# Patient Record
Sex: FEMALE | Race: OTHER | NOT HISPANIC OR LATINO | Employment: FULL TIME | URBAN - METROPOLITAN AREA
[De-identification: names, ages, dates, MRNs, and addresses within clinical notes are randomized per-mention and may not be internally consistent; named-entity substitution may affect disease eponyms.]

---

## 2017-06-02 ENCOUNTER — OFFICE VISIT (OUTPATIENT)
Dept: FAMILY MEDICINE | Facility: CLINIC | Age: 62
End: 2017-06-02
Payer: COMMERCIAL

## 2017-06-02 VITALS
HEART RATE: 98 BPM | SYSTOLIC BLOOD PRESSURE: 102 MMHG | RESPIRATION RATE: 18 BRPM | HEIGHT: 63 IN | TEMPERATURE: 100 F | DIASTOLIC BLOOD PRESSURE: 68 MMHG | WEIGHT: 131.38 LBS | BODY MASS INDEX: 23.28 KG/M2 | OXYGEN SATURATION: 96 %

## 2017-06-02 DIAGNOSIS — R52 BODY ACHES: Primary | ICD-10-CM

## 2017-06-02 DIAGNOSIS — N39.0 URINARY TRACT INFECTION WITH HEMATURIA, SITE UNSPECIFIED: ICD-10-CM

## 2017-06-02 DIAGNOSIS — R31.9 URINARY TRACT INFECTION WITH HEMATURIA, SITE UNSPECIFIED: ICD-10-CM

## 2017-06-02 DIAGNOSIS — J06.9 UPPER RESPIRATORY TRACT INFECTION, UNSPECIFIED TYPE: ICD-10-CM

## 2017-06-02 PROCEDURE — 99214 OFFICE O/P EST MOD 30 MIN: CPT | Mod: S$GLB,,, | Performed by: FAMILY MEDICINE

## 2017-06-02 PROCEDURE — 99999 PR PBB SHADOW E&M-EST. PATIENT-LVL III: CPT | Mod: PBBFAC,,, | Performed by: FAMILY MEDICINE

## 2017-06-02 RX ORDER — AMOXICILLIN AND CLAVULANATE POTASSIUM 500; 125 MG/1; MG/1
1 TABLET, FILM COATED ORAL 2 TIMES DAILY
Qty: 14 TABLET | Refills: 0 | Status: SHIPPED | OUTPATIENT
Start: 2017-06-02 | End: 2017-08-21

## 2017-06-02 RX ORDER — LORATADINE 10 MG/1
10 TABLET ORAL DAILY
Qty: 30 TABLET | Refills: 0 | Status: SHIPPED | OUTPATIENT
Start: 2017-06-02 | End: 2017-11-29

## 2017-06-02 NOTE — PROGRESS NOTES
Chief Complaint   Patient presents with    Sinusitis     2d    Cough     No production    Diarrhea     started this morning    Sore Throat       HPI  Bianka Bernard is a 61 y.o. female with multiple medical diagnoses as listed in the medical history and problem list that presents for URI.    URI - HA, +fever, +sneezing, +coughing, +body aches, +diarrhea, sore throat, 2 day hx.       Pt is known to me and was last seen by me on 1/21/2015.    PAST MEDICAL HISTORY:  Past Medical History:   Diagnosis Date    Diabetes mellitus, type 2     Hematuria     Long hx of this.  Being followed by Nephrologist, Dr. Burgos at [facility].       PAST SURGICAL HISTORY:  History reviewed. No pertinent surgical history.    SOCIAL HISTORY:  Social History     Social History    Marital status:      Spouse name: N/A    Number of children: N/A    Years of education: N/A     Occupational History    Not on file.     Social History Main Topics    Smoking status: Never Smoker    Smokeless tobacco: Not on file    Alcohol use No    Drug use: No    Sexual activity: Yes     Partners: Male     Other Topics Concern    Not on file     Social History Narrative    No narrative on file       FAMILY HISTORY:  Family History   Problem Relation Age of Onset    Hypertension Mother     Hypertension Father     Deep vein thrombosis Father     Hypertension Sister        ALLERGIES AND MEDICATIONS: updated and reviewed.  Review of patient's allergies indicates:   Allergen Reactions    Sulfa (sulfonamide antibiotics) Hives     Current Outpatient Prescriptions   Medication Sig Dispense Refill    meloxicam (MOBIC) 15 MG tablet Take 1 tablet (15 mg total) by mouth once daily. 30 tablet 3    metformin (GLUCOPHAGE) 1000 MG tablet   3    amoxicillin-clavulanate 500-125mg (AUGMENTIN) 500-125 mg Tab Take 1 tablet (500 mg total) by mouth 2 (two) times daily. 14 tablet 0    azithromycin (ZITHROMAX Z-LEANNA) 250 MG tablet 2 tabs today, then 1 tab per  "day for 4 days. 6 tablet 0    gabapentin (NEURONTIN) 300 MG capsule Take 1 capsule (300 mg total) by mouth every evening. In 1 week, if no relief, may increase dose to twice per day.  In another week, if no relief, may increase dose to 3 times per day. 90 capsule 2    loratadine (CLARITIN) 10 mg tablet Take 1 tablet (10 mg total) by mouth once daily. 30 tablet 0    metformin (GLUCOPHAGE) 850 MG tablet   3    VITAMIN D2 50,000 unit capsule   2     No current facility-administered medications for this visit.        ROS  Review of Systems   Constitutional: Negative for activity change, appetite change and fever.   HENT: Positive for congestion, postnasal drip, rhinorrhea, sinus pressure and sneezing.    Eyes: Positive for itching.   Respiratory: Positive for cough. Negative for shortness of breath and wheezing.    Cardiovascular: Negative for chest pain.   Gastrointestinal: Negative for abdominal pain, diarrhea and nausea.   Endocrine: Negative for polydipsia.   Genitourinary: Negative for dysuria.   Musculoskeletal: Negative for neck stiffness.   Skin: Negative for rash.   Neurological: Negative for numbness.   Psychiatric/Behavioral: Negative for agitation and dysphoric mood.       Physical Exam  Vitals:    06/02/17 0927   BP: 102/68   Pulse: 98   Resp: 18   Temp: 100.2 °F (37.9 °C)    Body mass index is 23.28 kg/m².  Weight: 59.6 kg (131 lb 6.3 oz)   Height: 5' 3" (160 cm)     Physical Exam   Constitutional: She is oriented to person, place, and time. She appears well-developed and well-nourished.   HENT:   Head: Normocephalic.   Mouth/Throat: No oropharyngeal exudate.   Erythematous pharynx  Erythematous, edematous nares  Mild dull TMs bilaterally   Eyes: Pupils are equal, round, and reactive to light. No scleral icterus.   Neck: Normal range of motion. Neck supple.   Cardiovascular: Normal rate, regular rhythm and normal heart sounds.    Pulmonary/Chest: Effort normal and breath sounds normal. No respiratory " distress.   Abdominal: Soft. Bowel sounds are normal. There is no tenderness.   Lymphadenopathy:     She has cervical adenopathy.   Neurological: She is alert and oriented to person, place, and time.   Skin: Skin is warm. No rash noted.   Psychiatric: She has a normal mood and affect. Her behavior is normal. Judgment and thought content normal.       Health Maintenance       Date Due Completion Date    Hepatitis C Screening 1955 ---    Foot Exam 09/23/1965 ---    Eye Exam 09/23/1965 ---    TETANUS VACCINE 09/23/1973 ---    Pap Smear with HPV Cotest 09/23/1976 ---    Mammogram 09/23/1995 ---    Colonoscopy 09/23/2005 ---    Hemoglobin A1c 03/05/2015 9/5/2014    Lipid Panel 09/05/2015 9/5/2014    Zoster Vaccine 09/23/2015 ---    Influenza Vaccine 08/01/2017 ---          Assessment & Plan    Body aches  -     POCT urinalysis, dipstick or tablet reag    Upper respiratory tract infection, unspecified type  -     loratadine (CLARITIN) 10 mg tablet; Take 1 tablet (10 mg total) by mouth once daily.  Dispense: 30 tablet; Refill: 0  - Will conservatively treat    Urinary tract infection with hematuria, site unspecified  -     amoxicillin-clavulanate 500-125mg (AUGMENTIN) 500-125 mg Tab; Take 1 tablet (500 mg total) by mouth 2 (two) times daily.  Dispense: 14 tablet; Refill: 0  -     Urine culture  - Will treat at this time, culture as well.         Return in about 4 weeks (around 6/30/2017).

## 2017-08-21 ENCOUNTER — OFFICE VISIT (OUTPATIENT)
Dept: FAMILY MEDICINE | Facility: CLINIC | Age: 62
End: 2017-08-21
Payer: COMMERCIAL

## 2017-08-21 VITALS
HEART RATE: 80 BPM | WEIGHT: 129.19 LBS | BODY MASS INDEX: 22.89 KG/M2 | DIASTOLIC BLOOD PRESSURE: 84 MMHG | HEIGHT: 63 IN | TEMPERATURE: 99 F | SYSTOLIC BLOOD PRESSURE: 132 MMHG | RESPIRATION RATE: 12 BRPM | OXYGEN SATURATION: 97 %

## 2017-08-21 DIAGNOSIS — Z12.31 OTHER SCREENING MAMMOGRAM: ICD-10-CM

## 2017-08-21 DIAGNOSIS — Z13.5 SCREENING FOR EYE CONDITION: ICD-10-CM

## 2017-08-21 DIAGNOSIS — R11.2 NAUSEA AND VOMITING, INTRACTABILITY OF VOMITING NOT SPECIFIED, UNSPECIFIED VOMITING TYPE: Primary | ICD-10-CM

## 2017-08-21 LAB
BILIRUB SERPL-MCNC: NORMAL MG/DL
BLOOD URINE, POC: 250
COLOR, POC UA: YELLOW
GLUCOSE SERPL-MCNC: 182 MG/DL (ref 70–110)
GLUCOSE UR QL STRIP: NORMAL
KETONES UR QL STRIP: NORMAL
LEUKOCYTE ESTERASE URINE, POC: NORMAL
NITRITE, POC UA: NORMAL
PH, POC UA: 5
PROTEIN, POC: NORMAL
SPECIFIC GRAVITY, POC UA: 1.02
UROBILINOGEN, POC UA: 1

## 2017-08-21 PROCEDURE — 99999 PR PBB SHADOW E&M-EST. PATIENT-LVL V: CPT | Mod: PBBFAC,,, | Performed by: PHYSICIAN ASSISTANT

## 2017-08-21 PROCEDURE — 3008F BODY MASS INDEX DOCD: CPT | Mod: S$GLB,,, | Performed by: PHYSICIAN ASSISTANT

## 2017-08-21 PROCEDURE — 99214 OFFICE O/P EST MOD 30 MIN: CPT | Mod: S$GLB,,, | Performed by: PHYSICIAN ASSISTANT

## 2017-08-21 PROCEDURE — 82948 REAGENT STRIP/BLOOD GLUCOSE: CPT | Mod: S$GLB,,, | Performed by: PHYSICIAN ASSISTANT

## 2017-08-21 PROCEDURE — 81002 URINALYSIS NONAUTO W/O SCOPE: CPT | Mod: S$GLB,,, | Performed by: PHYSICIAN ASSISTANT

## 2017-08-21 RX ORDER — ONDANSETRON 4 MG/1
4 TABLET, FILM COATED ORAL EVERY 8 HOURS PRN
Qty: 30 TABLET | Refills: 0 | Status: ON HOLD | OUTPATIENT
Start: 2017-08-21 | End: 2019-01-29 | Stop reason: HOSPADM

## 2017-08-21 NOTE — PROGRESS NOTES
Subjective:       Patient ID: Bianka Bernard is a 61 y.o. female.    Chief Complaint: Nausea (has felt nauseaous since friday, weakness)    Emesis    This is a new problem. The current episode started in the past 7 days (4 days). The problem occurs less than 2 times per day. The problem has been unchanged. The emesis has an appearance of bile. There has been no fever. Associated symptoms include dizziness. Pertinent negatives include no abdominal pain, chest pain, diarrhea, fever or myalgias. She has tried nothing for the symptoms.     Review of Systems   Constitutional: Negative for fever.   Cardiovascular: Negative for chest pain.   Gastrointestinal: Positive for nausea and vomiting. Negative for abdominal pain and diarrhea.   Genitourinary: Negative for dysuria, frequency and pelvic pain.   Musculoskeletal: Negative for myalgias.   Neurological: Positive for dizziness.       Objective:      Physical Exam   Constitutional: She appears well-developed and well-nourished. No distress.   HENT:   Head: Normocephalic and atraumatic.   Mouth/Throat: Oropharynx is clear and moist.   Cardiovascular: Normal rate and regular rhythm.    Pulmonary/Chest: Effort normal and breath sounds normal.   Abdominal: Soft. Bowel sounds are normal. She exhibits no distension. There is no tenderness.   Skin: She is not diaphoretic.   Vitals reviewed.      Assessment:       1. Nausea and vomiting, intractability of vomiting not specified, unspecified vomiting type    2. Screening for eye condition    3. Other screening mammogram        Plan:         Bianka was seen today for nausea.    Diagnoses and all orders for this visit:    Nausea and vomiting, intractability of vomiting not specified, unspecified vomiting type  -     POCT URINE DIPSTICK WITHOUT MICROSCOPE  -     POCT Glucose  -     ondansetron (ZOFRAN) 4 MG tablet; Take 1 tablet (4 mg total) by mouth every 8 (eight) hours as needed for Nausea.  -     Push fluids, increase diet as tolerated      Screening for eye condition  -     Ambulatory referral to Optometry    Other screening mammogram  -     Mammo Digital Screening Bilateral With CAD; Future

## 2017-11-29 ENCOUNTER — OFFICE VISIT (OUTPATIENT)
Dept: FAMILY MEDICINE | Facility: CLINIC | Age: 62
End: 2017-11-29
Payer: COMMERCIAL

## 2017-11-29 ENCOUNTER — HOSPITAL ENCOUNTER (OUTPATIENT)
Dept: RADIOLOGY | Facility: HOSPITAL | Age: 62
Discharge: HOME | End: 2017-11-29
Attending: PHYSICIAN ASSISTANT
Payer: COMMERCIAL

## 2017-11-29 ENCOUNTER — TELEPHONE (OUTPATIENT)
Dept: FAMILY MEDICINE | Facility: CLINIC | Age: 62
End: 2017-11-29

## 2017-11-29 VITALS
RESPIRATION RATE: 18 BRPM | BODY MASS INDEX: 23.48 KG/M2 | HEIGHT: 63 IN | HEART RATE: 93 BPM | TEMPERATURE: 98 F | OXYGEN SATURATION: 97 % | WEIGHT: 132.5 LBS | SYSTOLIC BLOOD PRESSURE: 132 MMHG | DIASTOLIC BLOOD PRESSURE: 70 MMHG

## 2017-11-29 DIAGNOSIS — M25.512 ACUTE PAIN OF LEFT SHOULDER: ICD-10-CM
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
## 2017-11-29 DIAGNOSIS — E11.9 TYPE 2 DIABETES MELLITUS WITHOUT COMPLICATION, WITHOUT LONG-TERM CURRENT USE OF INSULIN: ICD-10-CM

## 2017-11-29 DIAGNOSIS — Z11.59 NEED FOR HEPATITIS C SCREENING TEST: ICD-10-CM

## 2017-11-29 DIAGNOSIS — Z13.5 SCREENING FOR EYE CONDITION: ICD-10-CM

## 2017-11-29 DIAGNOSIS — Z13.220 SCREENING FOR CHOLESTEROL LEVEL: ICD-10-CM
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
## 2017-11-29 PROCEDURE — 73030 X-RAY EXAM OF SHOULDER: CPT | Mod: 26,LT,, | Performed by: RADIOLOGY

## 2017-11-29 PROCEDURE — 73030 X-RAY EXAM OF SHOULDER: CPT | Mod: TC,PO,LT

## 2017-11-29 PROCEDURE — 99214 OFFICE O/P EST MOD 30 MIN: CPT | Mod: S$GLB,,, | Performed by: PHYSICIAN ASSISTANT

## 2017-11-29 PROCEDURE — 99999 PR PBB SHADOW E&M-EST. PATIENT-LVL IV: CPT | Mod: PBBFAC,,, | Performed by: PHYSICIAN ASSISTANT

## 2017-11-29 RX ORDER — TIZANIDINE 4 MG/1
4 TABLET ORAL EVERY 8 HOURS
Qty: 30 TABLET | Refills: 0 | Status: SHIPPED | OUTPATIENT
Start: 2017-11-29 | End: 2017-12-09

## 2017-11-29 RX ORDER — NAPROXEN 500 MG/1
500 TABLET ORAL 2 TIMES DAILY
Qty: 30 TABLET | Refills: 0 | Status: SHIPPED | OUTPATIENT
Start: 2017-11-29 | End: 2017-12-28 | Stop reason: SDUPTHER
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
## 2017-11-29 NOTE — TELEPHONE ENCOUNTER
Pt seen for shoulder pain, after getting xray pt asked for note to return to work on Tuesday; states she will  Thursday morning when she comes for labs; please advise

## 2017-11-29 NOTE — PROGRESS NOTES
Diabetes Panel - will get today   Eye Exam - need referral to eye doctor   Foot Exam - will get today   Hepatitis C Screening- will get today   Influenza Vaccine - pt got vaccinated with job   Lipid Panel - will get today

## 2017-11-30 ENCOUNTER — LAB VISIT (OUTPATIENT)
Dept: LAB | Facility: HOSPITAL | Age: 62
End: 2017-11-30
Attending: PHYSICIAN ASSISTANT
Payer: COMMERCIAL

## 2017-11-30 DIAGNOSIS — Z11.59 NEED FOR HEPATITIS C SCREENING TEST: ICD-10-CM

## 2017-11-30 DIAGNOSIS — E11.9 TYPE 2 DIABETES MELLITUS WITHOUT COMPLICATION, WITHOUT LONG-TERM CURRENT USE OF INSULIN: ICD-10-CM

## 2017-11-30 DIAGNOSIS — Z13.220 SCREENING FOR CHOLESTEROL LEVEL: ICD-10-CM

## 2017-11-30 LAB
ALBUMIN SERPL BCP-MCNC: 3.6 G/DL
ALP SERPL-CCNC: 75 U/L
ALT SERPL W/O P-5'-P-CCNC: 23 U/L
ANION GAP SERPL CALC-SCNC: 8 MMOL/L
AST SERPL-CCNC: 20 U/L
BILIRUB SERPL-MCNC: 0.6 MG/DL
BUN SERPL-MCNC: 12 MG/DL
CALCIUM SERPL-MCNC: 9.6 MG/DL
CHLORIDE SERPL-SCNC: 105 MMOL/L
CHOLEST SERPL-MCNC: 216 MG/DL
CHOLEST/HDLC SERPL: 4.5 {RATIO}
CO2 SERPL-SCNC: 28 MMOL/L
CREAT SERPL-MCNC: 0.8 MG/DL
EST. GFR  (AFRICAN AMERICAN): >60 ML/MIN/1.73 M^2
EST. GFR  (NON AFRICAN AMERICAN): >60 ML/MIN/1.73 M^2
ESTIMATED AVG GLUCOSE: 160 MG/DL
GLUCOSE SERPL-MCNC: 164 MG/DL
HBA1C MFR BLD HPLC: 7.2 %
HDLC SERPL-MCNC: 48 MG/DL
HDLC SERPL: 22.2 %
LDLC SERPL CALC-MCNC: 126.2 MG/DL
NONHDLC SERPL-MCNC: 168 MG/DL
POTASSIUM SERPL-SCNC: 4.4 MMOL/L
PROT SERPL-MCNC: 7 G/DL
SODIUM SERPL-SCNC: 141 MMOL/L
TRIGL SERPL-MCNC: 209 MG/DL

## 2017-11-30 PROCEDURE — 86803 HEPATITIS C AB TEST: CPT

## 2017-11-30 PROCEDURE — 80061 LIPID PANEL: CPT

## 2017-11-30 PROCEDURE — 36415 COLL VENOUS BLD VENIPUNCTURE: CPT | Mod: PO

## 2017-11-30 PROCEDURE — 83036 HEMOGLOBIN GLYCOSYLATED A1C: CPT

## 2017-11-30 PROCEDURE — 80053 COMPREHEN METABOLIC PANEL: CPT

## 2017-12-01 DIAGNOSIS — E11.9 TYPE 2 DIABETES MELLITUS WITHOUT COMPLICATION: ICD-10-CM

## 2017-12-01 LAB — HCV AB SERPL QL IA: NEGATIVE

## 2017-12-27 ENCOUNTER — INITIAL CONSULT (OUTPATIENT)
Dept: OPTOMETRY | Facility: CLINIC | Age: 62
End: 2017-12-27
Payer: COMMERCIAL

## 2017-12-27 DIAGNOSIS — E11.9 TYPE 2 DIABETES MELLITUS WITHOUT OPHTHALMIC MANIFESTATIONS: Primary | ICD-10-CM

## 2017-12-27 DIAGNOSIS — H52.4 MYOPIA WITH PRESBYOPIA OF BOTH EYES: ICD-10-CM

## 2017-12-27 DIAGNOSIS — H52.13 MYOPIA WITH PRESBYOPIA OF BOTH EYES: ICD-10-CM

## 2017-12-27 DIAGNOSIS — H25.13 NUCLEAR SCLEROSIS OF BOTH EYES: ICD-10-CM

## 2017-12-27 PROCEDURE — 92004 COMPRE OPH EXAM NEW PT 1/>: CPT | Mod: S$GLB,,, | Performed by: OPTOMETRIST

## 2017-12-27 PROCEDURE — 99999 PR PBB SHADOW E&M-EST. PATIENT-LVL II: CPT | Mod: PBBFAC,,, | Performed by: OPTOMETRIST

## 2017-12-27 PROCEDURE — 92015 DETERMINE REFRACTIVE STATE: CPT | Mod: S$GLB,,, | Performed by: OPTOMETRIST

## 2017-12-27 NOTE — LETTER
December 27, 2017      Francisca Maya PA-C  [address]  [place] [address]           [facility]  [address]  [place] [address]  Phone: [phone]  Fax: [phone]          Patient: Bianka Bernard   MR Number: [id number]   YOB: 1955   Date of Visit: 12/27/2017       Dear Francisca Maya:    Thank you for referring Bianka Bernard to me for evaluation. Attached you will find relevant portions of my assessment and plan of care.    If you have questions, please do not hesitate to call me. I look forward to following Bianka Bernard along with you.    Sincerely,    Vera Benavidez, OD    Enclosure  CC:  No Recipients    If you would like to receive this communication electronically, please contact [email or web address] or [phone] to request more information on [employer] Link access.    For providers and/or their staff who would like to refer a patient to [facility], please contact us through our one-stop-shop provider referral line, [facility], at [phone].    If you feel you have received this communication in error or would no longer like to receive these types of communications, please e-mail [email or web address]

## 2017-12-27 NOTE — PROGRESS NOTES
HPI     Last eye exam was approximately 1 year ago.  Patient states no vision changes since last exam. Due for diabetic eye   exam.  Patient denies diplopia, headaches, flashes/floaters, and pain.    Hemoglobin A1C       Date                     Value               Ref Range             Status                11/30/2017               7.2 (H)             4.0 - 5.6 %           Final                  Last edited by Cindi Horner on 12/27/2017  2:26 PM. (History)            Assessment /Plan     For exam results, see Encounter Report.    Type 2 diabetes mellitus without ophthalmic manifestations    Nuclear sclerosis of both eyes    Myopia with presbyopia of both eyes            1.  No retinopathy--monitor yearly.   Educated pt eye health correlates with blood sugar control.    2.  Educated on cataracts and affects on vision.  Early-monitor.  3.  Bifocal rx given          RTC 1 year for dm exam.

## 2017-12-28 DIAGNOSIS — M25.512 ACUTE PAIN OF LEFT SHOULDER: ICD-10-CM

## 2017-12-28 RX ORDER — NAPROXEN 500 MG/1
500 TABLET ORAL 2 TIMES DAILY
Qty: 30 TABLET | Refills: 0 | Status: ON HOLD | OUTPATIENT
Start: 2017-12-28 | End: 2019-01-29 | Stop reason: HOSPADM

## 2017-12-28 NOTE — TELEPHONE ENCOUNTER
----- Message from Kenzie Cervantes sent at 12/28/2017 11:56 AM CST -----  Contact: self  Pt states she is still in pain and asking for a prescription to be sent to [facility].  Medication request is---naproxen (NAPROSYN) 500 MG tablet--- Pt call back [phone].

## 2018-01-15 ENCOUNTER — HOSPITAL ENCOUNTER (OUTPATIENT)
Dept: RADIOLOGY | Facility: HOSPITAL | Age: 63
Discharge: HOME | End: 2018-01-15
Attending: FAMILY MEDICINE
Payer: COMMERCIAL

## 2018-01-15 ENCOUNTER — OFFICE VISIT (OUTPATIENT)
Dept: INTERNAL MEDICINE | Facility: CLINIC | Age: 63
End: 2018-01-15
Payer: COMMERCIAL

## 2018-01-15 VITALS
WEIGHT: 132.5 LBS | SYSTOLIC BLOOD PRESSURE: 110 MMHG | HEIGHT: 63 IN | DIASTOLIC BLOOD PRESSURE: 60 MMHG | BODY MASS INDEX: 23.48 KG/M2

## 2018-01-15 DIAGNOSIS — R31.9 HEMATURIA, UNSPECIFIED TYPE: ICD-10-CM

## 2018-01-15 DIAGNOSIS — Z00.00 PREVENTATIVE HEALTH CARE: ICD-10-CM

## 2018-01-15 DIAGNOSIS — Z12.31 ENCOUNTER FOR SCREENING MAMMOGRAM FOR BREAST CANCER: ICD-10-CM

## 2018-01-15 DIAGNOSIS — E11.9 TYPE 2 DIABETES MELLITUS WITHOUT COMPLICATION, WITHOUT LONG-TERM CURRENT USE OF INSULIN: Primary | ICD-10-CM

## 2018-01-15 PROCEDURE — 77067 SCR MAMMO BI INCL CAD: CPT | Mod: 26,,, | Performed by: RADIOLOGY

## 2018-01-15 PROCEDURE — 82043 UR ALBUMIN QUANTITATIVE: CPT

## 2018-01-15 PROCEDURE — 99999 PR PBB SHADOW E&M-EST. PATIENT-LVL III: CPT | Mod: PBBFAC,,, | Performed by: FAMILY MEDICINE

## 2018-01-15 PROCEDURE — 77067 SCR MAMMO BI INCL CAD: CPT | Mod: TC

## 2018-01-15 PROCEDURE — 99396 PREV VISIT EST AGE 40-64: CPT | Mod: S$GLB,,, | Performed by: FAMILY MEDICINE

## 2018-01-15 RX ORDER — METFORMIN HYDROCHLORIDE 1000 MG/1
1000 TABLET ORAL 2 TIMES DAILY WITH MEALS
Qty: 90 TABLET | Refills: 3 | Status: SHIPPED | OUTPATIENT
Start: 2018-01-15 | End: 2018-10-01 | Stop reason: SDUPTHER

## 2018-01-15 RX ORDER — ATORVASTATIN CALCIUM 20 MG/1
20 TABLET, FILM COATED ORAL DAILY
Qty: 90 TABLET | Refills: 3 | Status: SHIPPED | OUTPATIENT
Start: 2018-01-15 | End: 2019-01-28

## 2018-01-15 NOTE — PROGRESS NOTES
Subjective:       Patient ID: Bianka Bernard is a 62 y.o. female.    Chief Complaint: Establish Care  Bianak Bernard 62 y.o. female is here for office visit to review care and physical exam,  HPI  Review of Systems   Constitutional: Negative for activity change, fatigue, fever and unexpected weight change.   HENT: Negative for congestion, hearing loss, postnasal drip and rhinorrhea.    Eyes: Negative for redness and visual disturbance.   Respiratory: Negative for chest tightness, shortness of breath and wheezing.    Cardiovascular: Negative for chest pain, palpitations and leg swelling.   Gastrointestinal: Negative for abdominal distention.   Genitourinary: Negative for decreased urine volume, dysuria, flank pain, hematuria, pelvic pain and urgency.   Musculoskeletal: Negative for back pain, gait problem, joint swelling and neck stiffness.   Skin: Negative for color change, rash and wound.   Neurological: Negative for dizziness, syncope, weakness and headaches.   Psychiatric/Behavioral: Negative for behavioral problems, confusion and sleep disturbance. The patient is not nervous/anxious.        Objective:      Physical Exam   Constitutional: She is oriented to person, place, and time. She appears well-developed and well-nourished. No distress.   HENT:   Head: Normocephalic.   Mouth/Throat: No oropharyngeal exudate.   Eyes: EOM are normal. Pupils are equal, round, and reactive to light. No scleral icterus.   Neck: Neck supple. No JVD present. No thyromegaly present.   Cardiovascular: Normal rate, regular rhythm and normal heart sounds.  Exam reveals no gallop and no friction rub.    No murmur heard.  Pulmonary/Chest: Effort normal and breath sounds normal. She has no wheezes. She has no rales.   Abdominal: Soft. Bowel sounds are normal. She exhibits no distension and no mass. There is no tenderness. There is no guarding.   Musculoskeletal: Normal range of motion. She exhibits no edema.   Lymphadenopathy:     She has no  cervical adenopathy.   Neurological: She is alert and oriented to person, place, and time. She has normal reflexes. She displays normal reflexes. No cranial nerve deficit. She exhibits normal muscle tone.   Skin: Skin is warm. No rash noted. No erythema.   Psychiatric: She has a normal mood and affect. Thought content normal.       Assessment:       1. Type 2 diabetes mellitus without complication, without long-term current use of insulin    2. Hematuria, unspecified type    3. Preventative health care        Plan:       Bianka was seen today for establish care.    Diagnoses and all orders for this visit:    Type 2 diabetes mellitus without complication, without long-term current use of insulin  -     atorvastatin (LIPITOR) 20 MG tablet; Take 1 tablet (20 mg total) by mouth once daily.  -     MICROALBUMIN / CREATININE RATIO URINE    Hematuria, unspecified type  -     Urinalysis; Future    Preventative health care  -     TSH; Future  -     Mammo Digital Screening Bilateral With CAD; Future

## 2018-01-16 LAB
CREAT UR-MCNC: 38 MG/DL
MICROALBUMIN UR DL<=1MG/L-MCNC: 4.8 UG/ML
MICROALBUMIN/CREATININE RATIO: 12.6 UG/MG

## 2018-04-16 PROBLEM — Z00.00 PREVENTATIVE HEALTH CARE: Status: RESOLVED | Noted: 2018-01-15 | Resolved: 2018-04-16

## 2018-07-11 ENCOUNTER — OFFICE VISIT (OUTPATIENT)
Dept: INTERNAL MEDICINE | Facility: CLINIC | Age: 63
End: 2018-07-11
Payer: COMMERCIAL

## 2018-07-11 ENCOUNTER — LAB VISIT (OUTPATIENT)
Dept: LAB | Facility: HOSPITAL | Age: 63
End: 2018-07-11
Attending: FAMILY MEDICINE
Payer: COMMERCIAL

## 2018-07-11 VITALS
WEIGHT: 136.25 LBS | HEIGHT: 63 IN | HEART RATE: 80 BPM | BODY MASS INDEX: 24.14 KG/M2 | SYSTOLIC BLOOD PRESSURE: 122 MMHG | DIASTOLIC BLOOD PRESSURE: 80 MMHG | OXYGEN SATURATION: 98 %

## 2018-07-11 DIAGNOSIS — E11.9 TYPE 2 DIABETES MELLITUS WITHOUT COMPLICATION, WITHOUT LONG-TERM CURRENT USE OF INSULIN: Primary | ICD-10-CM

## 2018-07-11 DIAGNOSIS — E78.00 PURE HYPERCHOLESTEROLEMIA: ICD-10-CM

## 2018-07-11 DIAGNOSIS — Z00.00 PREVENTATIVE HEALTH CARE: ICD-10-CM

## 2018-07-11 DIAGNOSIS — E11.9 TYPE 2 DIABETES MELLITUS WITHOUT COMPLICATION, WITHOUT LONG-TERM CURRENT USE OF INSULIN: ICD-10-CM

## 2018-07-11 DIAGNOSIS — R31.9 HEMATURIA, UNSPECIFIED TYPE: ICD-10-CM

## 2018-07-11 PROBLEM — E78.5 HYPERLIPIDEMIA: Status: ACTIVE | Noted: 2018-07-11

## 2018-07-11 LAB
ALBUMIN SERPL BCP-MCNC: 4 G/DL
ALP SERPL-CCNC: 77 U/L
ALT SERPL W/O P-5'-P-CCNC: 30 U/L
ANION GAP SERPL CALC-SCNC: 10 MMOL/L
AST SERPL-CCNC: 25 U/L
BILIRUB SERPL-MCNC: 0.5 MG/DL
BUN SERPL-MCNC: 10 MG/DL
CALCIUM SERPL-MCNC: 10 MG/DL
CHLORIDE SERPL-SCNC: 103 MMOL/L
CHOLEST SERPL-MCNC: 125 MG/DL
CHOLEST/HDLC SERPL: 2.2 {RATIO}
CO2 SERPL-SCNC: 26 MMOL/L
CREAT SERPL-MCNC: 0.9 MG/DL
EST. GFR  (AFRICAN AMERICAN): >60 ML/MIN/1.73 M^2
EST. GFR  (NON AFRICAN AMERICAN): >60 ML/MIN/1.73 M^2
ESTIMATED AVG GLUCOSE: 192 MG/DL
GLUCOSE SERPL-MCNC: 295 MG/DL
HBA1C MFR BLD HPLC: 8.3 %
HDLC SERPL-MCNC: 57 MG/DL
HDLC SERPL: 45.6 %
LDLC SERPL CALC-MCNC: 42 MG/DL
NONHDLC SERPL-MCNC: 68 MG/DL
POTASSIUM SERPL-SCNC: 5.2 MMOL/L
PROT SERPL-MCNC: 7.1 G/DL
SODIUM SERPL-SCNC: 139 MMOL/L
TRIGL SERPL-MCNC: 130 MG/DL
TSH SERPL DL<=0.005 MIU/L-ACNC: 0.89 UIU/ML

## 2018-07-11 PROCEDURE — 80061 LIPID PANEL: CPT

## 2018-07-11 PROCEDURE — 80053 COMPREHEN METABOLIC PANEL: CPT

## 2018-07-11 PROCEDURE — 36415 COLL VENOUS BLD VENIPUNCTURE: CPT

## 2018-07-11 PROCEDURE — 83036 HEMOGLOBIN GLYCOSYLATED A1C: CPT

## 2018-07-11 PROCEDURE — 99999 PR PBB SHADOW E&M-EST. PATIENT-LVL III: CPT | Mod: PBBFAC,,, | Performed by: FAMILY MEDICINE

## 2018-07-11 PROCEDURE — 3045F PR MOST RECENT HEMOGLOBIN A1C LEVEL 7.0-9.0%: CPT | Mod: CPTII,S$GLB,, | Performed by: FAMILY MEDICINE

## 2018-07-11 PROCEDURE — 99214 OFFICE O/P EST MOD 30 MIN: CPT | Mod: S$GLB,,, | Performed by: FAMILY MEDICINE

## 2018-07-11 PROCEDURE — 84443 ASSAY THYROID STIM HORMONE: CPT

## 2018-07-11 NOTE — PROGRESS NOTES
Subjective:       Patient ID: Bianka Bernard is a 62 y.o. female.    Chief Complaint: Follow-up  Bianka Bernard 62 y.o. female is here for office visit to review care and physical exam, here for usual care.  Feels well, is active, has changed diet and having less carbs/rice.  Here for usual care.  Was being seen at [facility].  Notes long h/u hematuria, not sure about previous testing.  HPI  Review of Systems   Constitutional: Negative for activity change, fatigue, fever and unexpected weight change.   HENT: Negative for congestion, hearing loss, postnasal drip and rhinorrhea.    Eyes: Negative for redness and visual disturbance.   Respiratory: Negative for chest tightness, shortness of breath and wheezing.    Cardiovascular: Negative for chest pain, palpitations and leg swelling.   Gastrointestinal: Negative for abdominal distention.   Genitourinary: Negative for decreased urine volume, dysuria, flank pain, hematuria, pelvic pain and urgency.   Musculoskeletal: Negative for back pain, gait problem, joint swelling and neck stiffness.   Skin: Negative for color change, rash and wound.   Neurological: Negative for dizziness, syncope, weakness and headaches.   Psychiatric/Behavioral: Negative for behavioral problems, confusion and sleep disturbance. The patient is not nervous/anxious.        Objective:      Physical Exam   Constitutional: She is oriented to person, place, and time. She appears well-developed and well-nourished. No distress.   HENT:   Head: Normocephalic.   Mouth/Throat: No oropharyngeal exudate.   Eyes: EOM are normal. Pupils are equal, round, and reactive to light. No scleral icterus.   Neck: Neck supple. No JVD present. No thyromegaly present.   Cardiovascular: Normal rate, regular rhythm and normal heart sounds.  Exam reveals no gallop and no friction rub.    No murmur heard.  Pulmonary/Chest: Effort normal and breath sounds normal. She has no wheezes. She has no rales.   Abdominal: Soft. Bowel sounds are  normal. She exhibits no distension and no mass. There is no tenderness. There is no guarding.   Musculoskeletal: Normal range of motion. She exhibits no edema.   Lymphadenopathy:     She has no cervical adenopathy.   Neurological: She is alert and oriented to person, place, and time. She has normal reflexes. She displays normal reflexes. No cranial nerve deficit. She exhibits normal muscle tone.   Skin: Skin is warm. No rash noted. No erythema.   Psychiatric: She has a normal mood and affect. Thought content normal.       Assessment:       1. Type 2 diabetes mellitus without complication, without long-term current use of insulin    2. Pure hypercholesterolemia    3. Hematuria, unspecified type    4. Preventative health care        Plan:       Bianka was seen today for follow-up.    Diagnoses and all orders for this visit:    Type 2 diabetes mellitus without complication, without long-term current use of insulin  -     Hemoglobin A1c; Future  -     Lipid panel; Future  -     Comprehensive metabolic panel; Future  -     TSH; Future    Pure hypercholesterolemia  -     Hemoglobin A1c; Future  -     Lipid panel; Future  -     Comprehensive metabolic panel; Future    Hematuria, unspecified type  -     Ambulatory Referral to Urology    Preventative health care  -     Fecal Immunochemical Test (iFOBT); Future

## 2018-07-11 NOTE — ADDENDUM NOTE
Addended by: NATALIIA BASS on: 7/11/2018 03:51 PM     Modules accepted: Orders, Level of Service

## 2018-07-12 ENCOUNTER — TELEPHONE (OUTPATIENT)
Dept: INTERNAL MEDICINE | Facility: CLINIC | Age: 63
End: 2018-07-12

## 2018-07-20 ENCOUNTER — TELEPHONE (OUTPATIENT)
Dept: INTERNAL MEDICINE | Facility: CLINIC | Age: 63
End: 2018-07-20

## 2018-08-01 ENCOUNTER — CLINICAL SUPPORT (OUTPATIENT)
Dept: AUDIOLOGY | Facility: CLINIC | Age: 63
End: 2018-08-01
Payer: COMMERCIAL

## 2018-08-01 DIAGNOSIS — H90.0 CONDUCTIVE HEARING LOSS, BILATERAL: Primary | ICD-10-CM

## 2018-08-01 PROCEDURE — 92567 TYMPANOMETRY: CPT | Mod: S$GLB,,, | Performed by: AUDIOLOGIST

## 2018-08-01 PROCEDURE — 92557 COMPREHENSIVE HEARING TEST: CPT | Mod: S$GLB,,, | Performed by: AUDIOLOGIST

## 2018-08-01 NOTE — PROGRESS NOTES
8/1/2018    AUDIOLOGICAL EVALUATION:    Bianka Bernard was seen for an audiological evaluation on 8/1/2018 for decreased hearing sensitivity bilaterally.    Pure tone threshold testing revealed a conductive hearing loss bilaterally, poorer for the right ear.  Speech reception thresholds were obtained at 45dBHL for the right ear and 30dBHL for the left ear.  Speech discrimination scores were obtained at 100% for the right ear and 100% for the left ear.    Tympanometry revealed Type A tympanograms bilaterally with absent acoustic reflexes.    Recommend:  1.  Otologic evaluation.  2.  Follow up audio as needed.

## 2018-08-07 ENCOUNTER — OFFICE VISIT (OUTPATIENT)
Dept: UROLOGY | Facility: CLINIC | Age: 63
End: 2018-08-07
Payer: COMMERCIAL

## 2018-08-07 VITALS
DIASTOLIC BLOOD PRESSURE: 85 MMHG | SYSTOLIC BLOOD PRESSURE: 160 MMHG | BODY MASS INDEX: 23.94 KG/M2 | HEART RATE: 83 BPM | HEIGHT: 63 IN | WEIGHT: 135.13 LBS

## 2018-08-07 DIAGNOSIS — R31.21 ASYMPTOMATIC MICROSCOPIC HEMATURIA: Primary | ICD-10-CM

## 2018-08-07 PROCEDURE — 88112 CYTOPATH CELL ENHANCE TECH: CPT | Mod: 26,,, | Performed by: PATHOLOGY

## 2018-08-07 PROCEDURE — 99243 OFF/OP CNSLTJ NEW/EST LOW 30: CPT | Mod: S$GLB,,, | Performed by: UROLOGY

## 2018-08-07 PROCEDURE — 99999 PR PBB SHADOW E&M-EST. PATIENT-LVL IV: CPT | Mod: PBBFAC,,, | Performed by: UROLOGY

## 2018-08-07 PROCEDURE — 88112 CYTOPATH CELL ENHANCE TECH: CPT | Performed by: PATHOLOGY

## 2018-08-07 NOTE — LETTER
August 7, 2018      Eloy Max MD  [address]  [facility] [address]           [facility] 4th Floor  [address]  [facility] [address]  Phone: [phone]          Patient: Bianka Bernard   MR Number: [id number]   YOB: 1955   Date of Visit: 8/7/2018       Dear Dr. Eloy Max:    Thank you for referring Bianka Bernard to me for evaluation. Attached you will find relevant portions of my assessment and plan of care.    If you have questions, please do not hesitate to call me. I look forward to following Bianka Bernard along with you.    Sincerely,    Marcio Arriaga MD    Enclosure  CC:  No Recipients    If you would like to receive this communication electronically, please contact [email or web address] or [phone] to request more information on [employer] Link access.    For providers and/or their staff who would like to refer a patient to [facility], please contact us through our one-stop-shop provider referral line, [facility], at [phone].    If you feel you have received this communication in error or would no longer like to receive these types of communications, please e-mail [email or web address]

## 2018-08-07 NOTE — PROGRESS NOTES
CC: Hematuria (hx of microhematuria for over 15 years. states she was a patient at [facility] for 15 years and had a total work up and was seeing a nephrologist. she is establing care here now. non smoker )      Bianka Bernard is a 62 y.o. woman who is here for the evaluation of Hematuria (hx of microhematuria for over 15 years. states she was a patient at [facility] for 15 years and had a total work up and was seeing a nephrologist. she is establing care here now. non smoker )    Hasn't seen blood but has previously had trace amount in UA. Notes she has a history of hematuria for the past 20 years. No problems voiding. Previous seen by Nephrologist, Dr. Burgos at [facility].   Non-smoker, no family hx of kidney stone, urologic malignancy.    Past Medical History:   Diagnosis Date    Cataract     Diabetes mellitus, type 2     Hematuria     Long hx of this.  Being followed by Nephrologist, Dr. Burgos at [facility].     Past Surgical History:   Procedure Laterality Date    BREAST BIOPSY Left     core    BREAST CYST ASPIRATION Bilateral      Social History   Substance Use Topics    Smoking status: Never Smoker    Smokeless tobacco: Never Used    Alcohol use No     Family History   Problem Relation Age of Onset    Hypertension Mother     Cataracts Mother     Hypertension Father     Deep vein thrombosis Father     Hypertension Sister      Allergy:  Review of patient's allergies indicates:   Allergen Reactions    Sulfa (sulfonamide antibiotics) Hives     Outpatient Encounter Prescriptions as of 8/7/2018   Medication Sig Dispense Refill    atorvastatin (LIPITOR) 20 MG tablet Take 1 tablet (20 mg total) by mouth once daily. 90 tablet 3    metFORMIN (GLUCOPHAGE) 1000 MG tablet Take 1 tablet (1,000 mg total) by mouth 2 (two) times daily with meals. 90 tablet 3    naproxen (NAPROSYN) 500 MG tablet Take 1 tablet (500 mg total) by mouth 2 (two) times daily. 30 tablet 0    ondansetron (ZOFRAN) 4 MG tablet Take 1 tablet (4 mg  total) by mouth every 8 (eight) hours as needed for Nausea. 30 tablet 0    VITAMIN D2 50,000 unit capsule Take 50,000 Units by mouth every 30 days.   2     No facility-administered encounter medications on file as of 8/7/2018.      Review of Systems   Review of Systems   Constitutional: Negative for weight loss.   HENT: Negative for hearing loss and tinnitus.    Respiratory: Negative for cough and shortness of breath.    Cardiovascular: Negative for chest pain.   Gastrointestinal: Negative for diarrhea, nausea and vomiting.   Genitourinary: Positive for hematuria. Negative for dysuria and frequency.   Musculoskeletal: Negative for joint pain.   Skin: Negative for rash.   Neurological: Negative for tremors, sensory change, seizures and headaches.   Endo/Heme/Allergies: Negative for polydipsia.   Psychiatric/Behavioral: Negative for memory loss.     Physical Exam     Vitals:    08/07/18 1515   BP: (!) 160/85   Pulse: 83     Physical Exam   Constitutional: She appears well-developed.   HENT:   Head: Normocephalic.   Neck: Neck supple.   Cardiovascular: Normal rate.    Pulmonary/Chest: Effort normal.   Abdominal: Soft.   Neurological: She is alert.   Skin: Skin is warm.     Psychiatric: She has a normal mood and affect.         LABS:  Lab Results   Component Value Date    CREATININE 0.9 07/11/2018    CREATININE 0.8 11/30/2017     No results found for: LABURIN     UA shows trace amounts of blood.    Assessment and Plan:  Bianka was seen today for hematuria.    Diagnoses and all orders for this visit:    Asymptomatic microscopic hematuria  -     Cytology, urine  -     POCT urine dipstick without microscope  -     US Retroperitoneal Limited; Future      Discussed CAT scan or US to ensure there are no urologic problems.  Schedule US today.  Blood test shows normal kidney function.  Urine cytology today, will schedule cystoscopy if any abnormalities are found.  Will return if she notices blood in urine for full urologic  workup.  All questions were answered.    Follow-up:  Follow-up if symptoms worsen or fail to improve.       I, Cas Mccollum, am acting as a scribe on this patient encounter in the presence and under the supervision of Dr. Arriaga.    08/07/2018 3:36 PM    I, Dr. Arriaga personally performed the services described in this documentation. All medical record entries made by the scribe were at my direction and in my presence.  I have reviewed the chart and agree that the record reflects my personal performance and is accurate and complete.     Dr. Arriaga  4:43 PM 08/07/2018

## 2018-08-09 ENCOUNTER — PATIENT MESSAGE (OUTPATIENT)
Dept: UROLOGY | Facility: CLINIC | Age: 63
End: 2018-08-09

## 2018-08-27 ENCOUNTER — HOSPITAL ENCOUNTER (OUTPATIENT)
Dept: RADIOLOGY | Facility: HOSPITAL | Age: 63
Discharge: HOME | End: 2018-08-27
Attending: UROLOGY
Payer: COMMERCIAL

## 2018-08-27 DIAGNOSIS — R31.21 ASYMPTOMATIC MICROSCOPIC HEMATURIA: ICD-10-CM

## 2018-08-27 PROCEDURE — 76775 US EXAM ABDO BACK WALL LIM: CPT | Mod: 26,,, | Performed by: RADIOLOGY

## 2018-08-27 PROCEDURE — 76775 US EXAM ABDO BACK WALL LIM: CPT | Mod: TC

## 2018-10-01 RX ORDER — METFORMIN HYDROCHLORIDE 1000 MG/1
1000 TABLET ORAL 2 TIMES DAILY WITH MEALS
Qty: 90 TABLET | Refills: 3 | Status: SHIPPED | OUTPATIENT
Start: 2018-10-01

## 2018-12-17 ENCOUNTER — OFFICE VISIT (OUTPATIENT)
Dept: OTOLARYNGOLOGY | Facility: CLINIC | Age: 63
End: 2018-12-17
Payer: COMMERCIAL

## 2018-12-17 VITALS
BODY MASS INDEX: 23.82 KG/M2 | DIASTOLIC BLOOD PRESSURE: 80 MMHG | WEIGHT: 134.5 LBS | SYSTOLIC BLOOD PRESSURE: 151 MMHG | HEART RATE: 90 BPM

## 2018-12-17 DIAGNOSIS — H80.93 OTOSCLEROSIS OF BOTH EARS: Primary | ICD-10-CM

## 2018-12-17 PROCEDURE — 99999 PR PBB SHADOW E&M-EST. PATIENT-LVL II: CPT | Mod: PBBFAC,,, | Performed by: OTOLARYNGOLOGY

## 2018-12-17 PROCEDURE — 3008F BODY MASS INDEX DOCD: CPT | Mod: CPTII,S$GLB,, | Performed by: OTOLARYNGOLOGY

## 2018-12-17 PROCEDURE — 99203 OFFICE O/P NEW LOW 30 MIN: CPT | Mod: S$GLB,,, | Performed by: OTOLARYNGOLOGY

## 2018-12-17 NOTE — PROGRESS NOTES
Subjective:       Patient ID: Bianka Bernard is a 63 y.o. female.    Chief Complaint: Hearing Loss    HPI: Has Joseph Prog HL.    AD> AS for yrs.    No Hx of inf.    Pos fam hx.    Past Medical History: Patient has a past medical history of Cataract, Diabetes mellitus, type 2, and Hematuria.    Past Surgical History: Patient has a past surgical history that includes Breast cyst aspiration (Bilateral) and Breast biopsy (Left).    Social History: Patient reports that  has never smoked. she has never used smokeless tobacco. She reports that she does not drink alcohol or use drugs.    Family History: family history includes Cataracts in her mother; Deep vein thrombosis in her father; Hypertension in her father, mother, and sister.    Medications:   Current Outpatient Medications   Medication Sig    atorvastatin (LIPITOR) 20 MG tablet Take 1 tablet (20 mg total) by mouth once daily.    metFORMIN (GLUCOPHAGE) 1000 MG tablet Take 1 tablet (1,000 mg total) by mouth 2 (two) times daily with meals.    naproxen (NAPROSYN) 500 MG tablet Take 1 tablet (500 mg total) by mouth 2 (two) times daily.    ondansetron (ZOFRAN) 4 MG tablet Take 1 tablet (4 mg total) by mouth every 8 (eight) hours as needed for Nausea.    VITAMIN D2 50,000 unit capsule Take 50,000 Units by mouth every 30 days.      No current facility-administered medications for this visit.        Allergies: Patient is allergic to sulfa (sulfonamide antibiotics).      Review of Systems   Constitutional: Negative for appetite change, chills, fatigue and fever.   HENT: Positive for hearing loss and tinnitus. Negative for congestion, ear discharge, facial swelling, postnasal drip, rhinorrhea, sore throat and trouble swallowing.    Eyes: Negative for photophobia, pain, discharge, redness, itching and visual disturbance.   Respiratory: Negative for apnea, cough, choking, chest tightness, shortness of breath, wheezing and stridor.    Cardiovascular: Negative for chest pain and  palpitations.   Gastrointestinal: Negative for abdominal pain, constipation, diarrhea, nausea and vomiting.   Musculoskeletal: Negative for arthralgias, gait problem, joint swelling, myalgias, neck pain and neck stiffness.   Skin: Negative for color change, pallor, rash and wound.   Neurological: Negative for dizziness, tremors, seizures, syncope, facial asymmetry, speech difficulty, weakness, light-headedness, numbness and headaches.   Hematological: Negative for adenopathy. Does not bruise/bleed easily.   Psychiatric/Behavioral: Negative for agitation, behavioral problems, confusion, decreased concentration, dysphoric mood, hallucinations, sleep disturbance and suicidal ideas. The patient is not nervous/anxious and is not hyperactive.        Objective:      Physical Exam   Constitutional: She is oriented to person, place, and time. She appears well-developed and well-nourished. She is cooperative.  Non-toxic appearance. She does not have a sickly appearance. She does not appear ill. No distress.   HENT:   Head: Normocephalic and atraumatic. Not macrocephalic and not microcephalic. Head is without raccoon's eyes, without Brothers's sign, without abrasion, without contusion, without laceration, without right periorbital erythema and without left periorbital erythema. Hair is normal.   Right Ear: Ear canal normal. No lacerations. No drainage, swelling or tenderness. No foreign bodies. No mastoid tenderness. Tympanic membrane is not injected, not scarred, not perforated, not erythematous, not retracted and not bulging. Tympanic membrane mobility is normal. No middle ear effusion. No hemotympanum. Decreased hearing is noted.   Left Ear: Ear canal normal. No lacerations. No drainage, swelling or tenderness. No foreign bodies. No mastoid tenderness. Tympanic membrane is not injected, not scarred, not perforated, not erythematous, not retracted and not bulging. Tympanic membrane mobility is normal.  No middle ear effusion.  No hemotympanum. Decreased hearing is noted.   Nose: No mucosal edema, rhinorrhea, nose lacerations, sinus tenderness, nasal deformity, septal deviation or nasal septal hematoma. No epistaxis.  No foreign bodies. Right sinus exhibits no maxillary sinus tenderness. Left sinus exhibits no maxillary sinus tenderness.       Rinne neg reed         Eyes: Conjunctivae, EOM and lids are normal. Pupils are equal, round, and reactive to light.   Neck: Normal range of motion. Neck supple. No JVD present. No tracheal tenderness and no muscular tenderness present. No neck rigidity. No tracheal deviation, no edema, no erythema and normal range of motion present. No thyroid mass and no thyromegaly present.   Cardiovascular: Normal rate and regular rhythm.   Pulmonary/Chest: Effort normal. No accessory muscle usage or stridor. No apnea, no tachypnea and no bradypnea. No respiratory distress.   Abdominal: Soft. Normal appearance.   Musculoskeletal: Normal range of motion.   Lymphadenopathy:        Head (right side): No submental, no submandibular, no tonsillar, no preauricular and no posterior auricular adenopathy present.        Head (left side): No submental, no submandibular, no tonsillar, no preauricular and no posterior auricular adenopathy present.     She has no cervical adenopathy.        Right cervical: No superficial cervical, no deep cervical and no posterior cervical adenopathy present.       Left cervical: No superficial cervical, no deep cervical and no posterior cervical adenopathy present.   Neurological: She is alert and oriented to person, place, and time. She displays no atrophy and no tremor. No cranial nerve deficit or sensory deficit. She exhibits normal muscle tone. She displays no seizure activity.   Skin: Skin is warm, dry and intact. No abrasion, no bruising, no burn, no ecchymosis, no laceration, no lesion and no rash noted. She is not diaphoretic. No erythema. No pallor.   Psychiatric: She has a normal  mood and affect. Her behavior is normal. Judgment and thought content normal. Her speech is not rapid and/or pressured and not slurred. Cognition and memory are normal. She is communicative.   Nursing note and vitals reviewed.              Assessment:       1. Otosclerosis of both ears        Plan:         Discussed Right small fenestra stapedotomy with patient. Also reviewed all other options including observation and amplification. Risks, complications, benefits and goals reviewed. This includes: failure to improve, total loss of hearing, tinnitus, dizziness, chorda symptoms, infection, bleeding, need for revision and other potential complications. Will proceed at the patients convinience a general outpatient procedure after appropriate clearances.

## 2018-12-21 ENCOUNTER — TELEPHONE (OUTPATIENT)
Dept: OTOLARYNGOLOGY | Facility: CLINIC | Age: 63
End: 2018-12-21

## 2018-12-21 NOTE — TELEPHONE ENCOUNTER
----- Message from Clari Natarajan sent at 12/21/2018  9:30 AM CST -----  Contact: patient   [phone]-please call above patient at number in message need to speak with the nurse

## 2018-12-26 ENCOUNTER — TELEPHONE (OUTPATIENT)
Dept: OTOLARYNGOLOGY | Facility: CLINIC | Age: 63
End: 2018-12-26

## 2018-12-26 DIAGNOSIS — H80.93 OTOSCLEROSIS OF BOTH EARS: Primary | ICD-10-CM

## 2019-01-28 RX ORDER — VIT C/E/ZN/COPPR/LUTEIN/ZEAXAN 250MG-90MG
1000 CAPSULE ORAL EVERY MORNING
COMMUNITY

## 2019-01-29 ENCOUNTER — HOSPITAL ENCOUNTER (OUTPATIENT)
Facility: HOSPITAL | Age: 64
Discharge: HOME | End: 2019-01-29
Attending: OTOLARYNGOLOGY | Admitting: OTOLARYNGOLOGY
Payer: COMMERCIAL

## 2019-01-29 ENCOUNTER — ANESTHESIA EVENT (OUTPATIENT)
Dept: SURGERY | Facility: HOSPITAL | Age: 64
End: 2019-01-29
Payer: COMMERCIAL

## 2019-01-29 ENCOUNTER — ANESTHESIA (OUTPATIENT)
Dept: SURGERY | Facility: HOSPITAL | Age: 64
End: 2019-01-29
Payer: COMMERCIAL

## 2019-01-29 VITALS
TEMPERATURE: 98 F | DIASTOLIC BLOOD PRESSURE: 69 MMHG | SYSTOLIC BLOOD PRESSURE: 148 MMHG | BODY MASS INDEX: 23.04 KG/M2 | RESPIRATION RATE: 18 BRPM | HEIGHT: 63 IN | WEIGHT: 130 LBS | OXYGEN SATURATION: 100 % | HEART RATE: 72 BPM

## 2019-01-29 DIAGNOSIS — H80.91 OTOSCLEROSIS OF RIGHT EAR: Primary | ICD-10-CM

## 2019-01-29 LAB
POCT GLUCOSE: 221 MG/DL (ref 70–110)
POCT GLUCOSE: 256 MG/DL (ref 70–110)
POCT GLUCOSE: 327 MG/DL (ref 70–110)

## 2019-01-29 PROCEDURE — 27000221 HC OXYGEN, UP TO 24 HOURS

## 2019-01-29 PROCEDURE — 36000708 HC OR TIME LEV III 1ST 15 MIN: Performed by: OTOLARYNGOLOGY

## 2019-01-29 PROCEDURE — D9220A PRA ANESTHESIA: Mod: ANES,,, | Performed by: ANESTHESIOLOGY

## 2019-01-29 PROCEDURE — D9220A PRA ANESTHESIA: ICD-10-PCS | Mod: ANES,,, | Performed by: ANESTHESIOLOGY

## 2019-01-29 PROCEDURE — 82962 GLUCOSE BLOOD TEST: CPT | Performed by: OTOLARYNGOLOGY

## 2019-01-29 PROCEDURE — 36000709 HC OR TIME LEV III EA ADD 15 MIN: Performed by: OTOLARYNGOLOGY

## 2019-01-29 PROCEDURE — 63600175 PHARM REV CODE 636 W HCPCS: Performed by: OTOLARYNGOLOGY

## 2019-01-29 PROCEDURE — 63600175 PHARM REV CODE 636 W HCPCS: Performed by: NURSE ANESTHETIST, CERTIFIED REGISTERED

## 2019-01-29 PROCEDURE — 37000008 HC ANESTHESIA 1ST 15 MINUTES: Performed by: OTOLARYNGOLOGY

## 2019-01-29 PROCEDURE — 69660 PR STAPEDECTOMY: ICD-10-PCS | Mod: RT,,, | Performed by: OTOLARYNGOLOGY

## 2019-01-29 PROCEDURE — D9220A PRA ANESTHESIA: ICD-10-PCS | Mod: CRNA,,, | Performed by: NURSE ANESTHETIST, CERTIFIED REGISTERED

## 2019-01-29 PROCEDURE — 71000033 HC RECOVERY, INTIAL HOUR: Performed by: OTOLARYNGOLOGY

## 2019-01-29 PROCEDURE — 27201423 OPTIME MED/SURG SUP & DEVICES STERILE SUPPLY: Performed by: OTOLARYNGOLOGY

## 2019-01-29 PROCEDURE — 94761 N-INVAS EAR/PLS OXIMETRY MLT: CPT

## 2019-01-29 PROCEDURE — 37000009 HC ANESTHESIA EA ADD 15 MINS: Performed by: OTOLARYNGOLOGY

## 2019-01-29 PROCEDURE — 25000003 PHARM REV CODE 250

## 2019-01-29 PROCEDURE — 63600175 PHARM REV CODE 636 W HCPCS: Performed by: ANESTHESIOLOGY

## 2019-01-29 PROCEDURE — L8613 OSSICULAR IMPLANT: HCPCS | Performed by: OTOLARYNGOLOGY

## 2019-01-29 PROCEDURE — D9220A PRA ANESTHESIA: Mod: CRNA,,, | Performed by: NURSE ANESTHETIST, CERTIFIED REGISTERED

## 2019-01-29 PROCEDURE — 25000003 PHARM REV CODE 250: Performed by: NURSE ANESTHETIST, CERTIFIED REGISTERED

## 2019-01-29 PROCEDURE — 71000016 HC POSTOP RECOV ADDL HR: Performed by: OTOLARYNGOLOGY

## 2019-01-29 PROCEDURE — 25000003 PHARM REV CODE 250: Performed by: ANESTHESIOLOGY

## 2019-01-29 PROCEDURE — 69660 REVISE MIDDLE EAR BONE: CPT | Mod: RT,,, | Performed by: OTOLARYNGOLOGY

## 2019-01-29 PROCEDURE — 25000003 PHARM REV CODE 250: Performed by: OTOLARYNGOLOGY

## 2019-01-29 PROCEDURE — 71000039 HC RECOVERY, EACH ADD'L HOUR: Performed by: OTOLARYNGOLOGY

## 2019-01-29 PROCEDURE — 71000015 HC POSTOP RECOV 1ST HR: Performed by: OTOLARYNGOLOGY

## 2019-01-29 DEVICE — IMPLANTABLE DEVICE: Type: IMPLANTABLE DEVICE | Site: EAR | Status: FUNCTIONAL

## 2019-01-29 RX ORDER — DIAZEPAM 5 MG/1
TABLET ORAL
Status: COMPLETED
Start: 2019-01-29 | End: 2019-01-29

## 2019-01-29 RX ORDER — DEXAMETHASONE SODIUM PHOSPHATE 4 MG/ML
INJECTION, SOLUTION INTRA-ARTICULAR; INTRALESIONAL; INTRAMUSCULAR; INTRAVENOUS; SOFT TISSUE
Status: DISCONTINUED | OUTPATIENT
Start: 2019-01-29 | End: 2019-01-29

## 2019-01-29 RX ORDER — NEOMYCIN SULFATE, POLYMYXIN B SULFATE AND HYDROCORTISONE 10; 3.5; 1 MG/ML; MG/ML; [USP'U]/ML
SUSPENSION/ DROPS AURICULAR (OTIC)
Status: DISCONTINUED | OUTPATIENT
Start: 2019-01-29 | End: 2019-01-29 | Stop reason: HOSPADM

## 2019-01-29 RX ORDER — DIAZEPAM 2 MG/1
2 TABLET ORAL ONCE
Status: DISCONTINUED | OUTPATIENT
Start: 2019-01-29 | End: 2019-01-29

## 2019-01-29 RX ORDER — DIAZEPAM 5 MG/1
10 TABLET ORAL ONCE
Status: COMPLETED | OUTPATIENT
Start: 2019-01-29 | End: 2019-01-29

## 2019-01-29 RX ORDER — PROMETHAZINE HYDROCHLORIDE 12.5 MG/1
12.5 TABLET ORAL EVERY 6 HOURS PRN
Qty: 16 TABLET | Refills: 0 | Status: SHIPPED | OUTPATIENT
Start: 2019-01-29 | End: 2023-12-14

## 2019-01-29 RX ORDER — DIAZEPAM 5 MG/1
TABLET ORAL
Status: DISCONTINUED
Start: 2019-01-29 | End: 2019-01-29 | Stop reason: HOSPADM

## 2019-01-29 RX ORDER — PROMETHAZINE HYDROCHLORIDE 25 MG/ML
12.5 INJECTION, SOLUTION INTRAMUSCULAR; INTRAVENOUS ONCE
Status: DISCONTINUED | OUTPATIENT
Start: 2019-01-29 | End: 2019-01-29 | Stop reason: ALTCHOICE

## 2019-01-29 RX ORDER — HYDROCODONE BITARTRATE AND ACETAMINOPHEN 5; 325 MG/1; MG/1
1 TABLET ORAL EVERY 6 HOURS PRN
Qty: 21 TABLET | Refills: 0 | Status: SHIPPED | OUTPATIENT
Start: 2019-01-29 | End: 2023-12-14

## 2019-01-29 RX ORDER — MECLIZINE HYDROCHLORIDE 25 MG/1
25 TABLET ORAL 3 TIMES DAILY PRN
Status: DISCONTINUED | OUTPATIENT
Start: 2019-01-29 | End: 2019-01-29 | Stop reason: HOSPADM

## 2019-01-29 RX ORDER — NEOMYCIN SULFATE, POLYMYXIN B SULFATE AND HYDROCORTISONE 10; 3.5; 1 MG/ML; MG/ML; [USP'U]/ML
3 SUSPENSION/ DROPS AURICULAR (OTIC) 3 TIMES DAILY
Qty: 19 ML | Refills: 3 | Status: ON HOLD | OUTPATIENT
Start: 2019-02-05 | End: 2023-12-22 | Stop reason: HOSPADM

## 2019-01-29 RX ORDER — SODIUM CHLORIDE 0.9 % (FLUSH) 0.9 %
3 SYRINGE (ML) INJECTION
Status: DISCONTINUED | OUTPATIENT
Start: 2019-01-29 | End: 2019-01-29 | Stop reason: HOSPADM

## 2019-01-29 RX ORDER — ROCURONIUM BROMIDE 10 MG/ML
INJECTION, SOLUTION INTRAVENOUS
Status: DISCONTINUED | OUTPATIENT
Start: 2019-01-29 | End: 2019-01-29

## 2019-01-29 RX ORDER — ACETAMINOPHEN 10 MG/ML
INJECTION, SOLUTION INTRAVENOUS
Status: DISCONTINUED | OUTPATIENT
Start: 2019-01-29 | End: 2019-01-29

## 2019-01-29 RX ORDER — PHENYLEPHRINE HYDROCHLORIDE 10 MG/ML
INJECTION INTRAVENOUS
Status: DISCONTINUED | OUTPATIENT
Start: 2019-01-29 | End: 2019-01-29

## 2019-01-29 RX ORDER — GLYCOPYRROLATE 0.2 MG/ML
INJECTION INTRAMUSCULAR; INTRAVENOUS
Status: DISCONTINUED | OUTPATIENT
Start: 2019-01-29 | End: 2019-01-29

## 2019-01-29 RX ORDER — SODIUM CHLORIDE 9 MG/ML
INJECTION, SOLUTION INTRAVENOUS CONTINUOUS
Status: DISCONTINUED | OUTPATIENT
Start: 2019-01-29 | End: 2019-01-29 | Stop reason: HOSPADM

## 2019-01-29 RX ORDER — MIDAZOLAM HYDROCHLORIDE 1 MG/ML
INJECTION, SOLUTION INTRAMUSCULAR; INTRAVENOUS
Status: DISCONTINUED | OUTPATIENT
Start: 2019-01-29 | End: 2019-01-29

## 2019-01-29 RX ORDER — LIDOCAINE HCL/PF 100 MG/5ML
SYRINGE (ML) INTRAVENOUS
Status: DISCONTINUED | OUTPATIENT
Start: 2019-01-29 | End: 2019-01-29

## 2019-01-29 RX ORDER — PROMETHAZINE HYDROCHLORIDE 12.5 MG/1
12.5 TABLET ORAL EVERY 6 HOURS PRN
Status: DISCONTINUED | OUTPATIENT
Start: 2019-01-29 | End: 2019-01-29 | Stop reason: HOSPADM

## 2019-01-29 RX ORDER — ONDANSETRON 2 MG/ML
INJECTION INTRAMUSCULAR; INTRAVENOUS
Status: DISCONTINUED | OUTPATIENT
Start: 2019-01-29 | End: 2019-01-29

## 2019-01-29 RX ORDER — HYDROMORPHONE HYDROCHLORIDE 1 MG/ML
0.2 INJECTION, SOLUTION INTRAMUSCULAR; INTRAVENOUS; SUBCUTANEOUS EVERY 5 MIN PRN
Status: DISCONTINUED | OUTPATIENT
Start: 2019-01-29 | End: 2019-01-29 | Stop reason: HOSPADM

## 2019-01-29 RX ORDER — PROPOFOL 10 MG/ML
VIAL (ML) INTRAVENOUS
Status: DISCONTINUED | OUTPATIENT
Start: 2019-01-29 | End: 2019-01-29

## 2019-01-29 RX ORDER — MECLIZINE HCL 12.5 MG 12.5 MG/1
12.5 TABLET ORAL 3 TIMES DAILY PRN
Qty: 10 TABLET | Refills: 0 | Status: ON HOLD | OUTPATIENT
Start: 2019-01-29 | End: 2023-12-22 | Stop reason: HOSPADM

## 2019-01-29 RX ORDER — NEOSTIGMINE METHYLSULFATE 1 MG/ML
INJECTION, SOLUTION INTRAVENOUS
Status: DISCONTINUED | OUTPATIENT
Start: 2019-01-29 | End: 2019-01-29

## 2019-01-29 RX ORDER — FENTANYL CITRATE 50 UG/ML
INJECTION, SOLUTION INTRAMUSCULAR; INTRAVENOUS
Status: DISCONTINUED | OUTPATIENT
Start: 2019-01-29 | End: 2019-01-29

## 2019-01-29 RX ORDER — CEFAZOLIN SODIUM 1 G/3ML
2 INJECTION, POWDER, FOR SOLUTION INTRAMUSCULAR; INTRAVENOUS ONCE
Status: COMPLETED | OUTPATIENT
Start: 2019-01-29 | End: 2019-01-29

## 2019-01-29 RX ORDER — MEPERIDINE HYDROCHLORIDE 50 MG/ML
12.5 INJECTION INTRAMUSCULAR; INTRAVENOUS; SUBCUTANEOUS EVERY 10 MIN PRN
Status: DISCONTINUED | OUTPATIENT
Start: 2019-01-29 | End: 2019-01-29 | Stop reason: HOSPADM

## 2019-01-29 RX ORDER — INSULIN ASPART 100 [IU]/ML
0-5 INJECTION, SOLUTION INTRAVENOUS; SUBCUTANEOUS EVERY 6 HOURS PRN
Status: DISCONTINUED | OUTPATIENT
Start: 2019-01-29 | End: 2019-01-29 | Stop reason: HOSPADM

## 2019-01-29 RX ORDER — GLUCAGON 1 MG
1 KIT INJECTION
Status: DISCONTINUED | OUTPATIENT
Start: 2019-01-29 | End: 2019-01-29 | Stop reason: HOSPADM

## 2019-01-29 RX ORDER — HYDROCODONE BITARTRATE AND ACETAMINOPHEN 5; 325 MG/1; MG/1
1 TABLET ORAL EVERY 6 HOURS PRN
Status: DISCONTINUED | OUTPATIENT
Start: 2019-01-29 | End: 2019-01-29 | Stop reason: HOSPADM

## 2019-01-29 RX ADMIN — INSULIN ASPART 2 UNITS: 100 INJECTION, SOLUTION INTRAVENOUS; SUBCUTANEOUS at 05:01

## 2019-01-29 RX ADMIN — ROCURONIUM BROMIDE 10 MG: 10 INJECTION, SOLUTION INTRAVENOUS at 08:01

## 2019-01-29 RX ADMIN — PHENYLEPHRINE HYDROCHLORIDE 200 MCG: 10 INJECTION INTRAVENOUS at 08:01

## 2019-01-29 RX ADMIN — ACETAMINOPHEN 1000 MG: 10 INJECTION, SOLUTION INTRAVENOUS at 08:01

## 2019-01-29 RX ADMIN — PHENYLEPHRINE HYDROCHLORIDE 100 MCG: 10 INJECTION INTRAVENOUS at 08:01

## 2019-01-29 RX ADMIN — INSULIN ASPART 3 UNITS: 100 INJECTION, SOLUTION INTRAVENOUS; SUBCUTANEOUS at 07:01

## 2019-01-29 RX ADMIN — SODIUM CHLORIDE: 0.9 INJECTION, SOLUTION INTRAVENOUS at 07:01

## 2019-01-29 RX ADMIN — PROPOFOL 150 MG: 10 INJECTION, EMULSION INTRAVENOUS at 07:01

## 2019-01-29 RX ADMIN — ROCURONIUM BROMIDE 30 MG: 10 INJECTION, SOLUTION INTRAVENOUS at 07:01

## 2019-01-29 RX ADMIN — FENTANYL CITRATE 50 MCG: 50 INJECTION, SOLUTION INTRAMUSCULAR; INTRAVENOUS at 07:01

## 2019-01-29 RX ADMIN — DEXAMETHASONE SODIUM PHOSPHATE 4 MG: 4 INJECTION, SOLUTION INTRAMUSCULAR; INTRAVENOUS at 08:01

## 2019-01-29 RX ADMIN — GLYCOPYRROLATE 0.4 MCG: 0.2 INJECTION, SOLUTION INTRAMUSCULAR; INTRAVENOUS at 08:01

## 2019-01-29 RX ADMIN — SODIUM CHLORIDE, SODIUM GLUCONATE, SODIUM ACETATE, POTASSIUM CHLORIDE, MAGNESIUM CHLORIDE, SODIUM PHOSPHATE, DIBASIC, AND POTASSIUM PHOSPHATE: .53; .5; .37; .037; .03; .012; .00082 INJECTION, SOLUTION INTRAVENOUS at 08:01

## 2019-01-29 RX ADMIN — DEXAMETHASONE SODIUM PHOSPHATE 8 MG: 4 INJECTION, SOLUTION INTRAMUSCULAR; INTRAVENOUS at 07:01

## 2019-01-29 RX ADMIN — ONDANSETRON 4 MG: 2 INJECTION INTRAMUSCULAR; INTRAVENOUS at 09:01

## 2019-01-29 RX ADMIN — PHENYLEPHRINE HYDROCHLORIDE 200 MCG: 10 INJECTION INTRAVENOUS at 07:01

## 2019-01-29 RX ADMIN — MECLIZINE HYDROCHLORIDE 25 MG: 25 TABLET ORAL at 12:01

## 2019-01-29 RX ADMIN — DIAZEPAM 10 MG: 5 TABLET ORAL at 05:01

## 2019-01-29 RX ADMIN — FENTANYL CITRATE 25 MCG: 50 INJECTION, SOLUTION INTRAMUSCULAR; INTRAVENOUS at 07:01

## 2019-01-29 RX ADMIN — CEFAZOLIN 2 G: 330 INJECTION, POWDER, FOR SOLUTION INTRAMUSCULAR; INTRAVENOUS at 07:01

## 2019-01-29 RX ADMIN — PROMETHAZINE HYDROCHLORIDE 6.25 MG: 25 INJECTION INTRAMUSCULAR; INTRAVENOUS at 09:01

## 2019-01-29 RX ADMIN — MIDAZOLAM HYDROCHLORIDE 2 MG: 1 INJECTION, SOLUTION INTRAMUSCULAR; INTRAVENOUS at 07:01

## 2019-01-29 RX ADMIN — FENTANYL CITRATE 25 MCG: 50 INJECTION, SOLUTION INTRAMUSCULAR; INTRAVENOUS at 09:01

## 2019-01-29 RX ADMIN — PHENYLEPHRINE HYDROCHLORIDE 100 MCG: 10 INJECTION INTRAVENOUS at 07:01

## 2019-01-29 RX ADMIN — PROMETHAZINE HYDROCHLORIDE 6.25 MG: 25 INJECTION INTRAMUSCULAR; INTRAVENOUS at 12:01

## 2019-01-29 RX ADMIN — LIDOCAINE HYDROCHLORIDE 50 MG: 20 INJECTION, SOLUTION INTRAVENOUS at 07:01

## 2019-01-29 RX ADMIN — NEOSTIGMINE METHYLSULFATE 3 MG: 1 INJECTION INTRAVENOUS at 08:01

## 2019-01-29 NOTE — ANESTHESIA RELEASE NOTE
"Anesthesia Release from PACU Note    Patient: Bianka Bernard    Procedure(s) Performed: Procedure(s) (LRB):  STAPEDECTOMY (Right)    Anesthesia type: GEN    Post pain: Adequate analgesia reported    Post assessment: no apparent anesthetic complications, tolerated procedure well and no evidence of recall    Post vital signs: BP (!) 165/72   Pulse 64   Temp 36.3 °C (97.3 °F) (Temporal)   Resp 14   Ht 5' 3" (1.6 m)   Wt 59 kg (130 lb)   SpO2 (!) 93%   Breastfeeding? No   BMI 23.03 kg/m²     Level of consciousness: awake, alert and oriented    Nausea/Vomiting: nausea being treated, no emesis  Complications: none    Airway Patency: patent    Respiratory: unassisted, spontaneous ventilation, room air    Cardiovascular: stable and blood pressure at baseline    Hydration: euvolemic    "

## 2019-01-29 NOTE — TRANSFER OF CARE
"Anesthesia Transfer of Care Note    Patient: Biakna Bernard    Procedure(s) Performed: Procedure(s) (LRB):  STAPEDECTOMY (Right)    Patient location: PACU    Anesthesia Type: general    Transport from OR: Transported from OR on 6-10 L/min O2 by face mask with adequate spontaneous ventilation    Post pain: adequate analgesia    Post assessment: no apparent anesthetic complications and tolerated procedure well    Post vital signs: stable    Level of consciousness: awake    Nausea/Vomiting: no nausea/vomiting    Complications: none    Transfer of care protocol was followed      Last vitals:   Visit Vitals  BP (!) 179/79 (BP Location: Left arm, Patient Position: Lying)   Pulse 84   Temp 36.6 °C (97.8 °F) (Oral)   Resp 18   Ht 5' 3" (1.6 m)   Wt 59 kg (130 lb)   SpO2 97%   Breastfeeding? No   BMI 23.03 kg/m²     "

## 2019-01-29 NOTE — BRIEF OP NOTE
[facility]  Brief Operative Note     SUMMARY     Surgery Date: 1/29/2019     Surgeon(s) and Role:     * Luis Solorio MD - Primary     * Jacob Patrick Brunner, MD - Resident - Assisting        Pre-op Diagnosis:  Otosclerosis of both ears [H80.93]    Post-op Diagnosis:  Post-Op Diagnosis Codes:     * Otosclerosis of both ears [H80.93]    Procedure(s) (LRB):  STAPEDECTOMY (Right)    Anesthesia: General    Description of the findings of the procedure: stapedectomy    Findings/Key Components: right otosclerosis    Estimated Blood Loss: * No values recorded between 1/29/2019  8:11 AM and 1/29/2019  9:14 AM *         Specimens:   Specimen (12h ago, onward)    None          Discharge Note    SUMMARY     Admit Date: 1/29/2019    Discharge Date and Time:  01/29/2019 12:16 PM    Hospital Course (synopsis of major diagnoses, care, treatment, and services provided during the course of the hospital stay): POD0 s/p right stapedectomy which went without apparent complications, will discharge to home with prompt follow up.     Final Diagnosis: Post-Op Diagnosis Codes:     * Otosclerosis of both ears [H80.93]    Disposition: Home or Self Care    Follow Up/Patient Instructions:     Medications:  Reconciled Home Medications:      Medication List      START taking these medications    HYDROcodone-acetaminophen 5-325 mg per tablet  Commonly known as:  NORCO  Take 1 tablet by mouth every 6 (six) hours as needed.     meclizine 12.5 mg tablet  Commonly known as:  ANTIVERT  Take 1 tablet (12.5 mg total) by mouth 3 (three) times daily as needed for Dizziness.     neomycin-polymyxin-hydrocortisone 3.5-10,000-1 mg/mL-unit/mL-% otic suspension  Commonly known as:  CORTISPORIN  Place 3 drops into the right ear 3 (three) times daily.  Start taking on:  2/5/2019     promethazine 12.5 MG Tab  Commonly known as:  PHENERGAN  Take 1 tablet (12.5 mg total) by mouth every 6 (six) hours as needed (nausea/vomiting).         CONTINUE taking these medications    atorvastatin 20 MG tablet  Commonly known as:  LIPITOR  Take 1 tablet (20 mg total) by mouth once daily.     MAGNESIUM ORAL  Take by mouth every morning.     metFORMIN 1000 MG tablet  Commonly known as:  GLUCOPHAGE  Take 1 tablet (1,000 mg total) by mouth 2 (two) times daily with meals.     multivitamin with minerals tablet  Take 1 tablet by mouth every morning.     VITAMIN D3 1,000 unit capsule  Generic drug:  cholecalciferol (vitamin D3)  Take 1,000 Units by mouth every morning.        STOP taking these medications    naproxen 500 MG tablet  Commonly known as:  NAPROSYN     ondansetron 4 MG tablet  Commonly known as:  ZOFRAN     VITAMIN D2 50,000 unit Cap  Generic drug:  ergocalciferol          Discharge Procedure Orders   Other restrictions (specify):   Scheduling Instructions: Light activity, no straining, do not lift more than 5 pounds    Do not get ear wet    Try to avoid sneezing, coughing, blowing nose     No dressing needed     Notify your health care provider if you experience any of the following:  temperature >100.4     Notify your health care provider if you experience any of the following:  persistent nausea and vomiting or diarrhea     Notify your health care provider if you experience any of the following:  severe uncontrolled pain     Notify your health care provider if you experience any of the following:  redness, tenderness, or signs of infection (pain, swelling, redness, odor or green/yellow discharge around incision site)     Notify your health care provider if you experience any of the following:  severe persistent headache     Notify your health care provider if you experience any of the following:  difficulty breathing or increased cough     Notify your health care provider if you experience any of the following:  worsening rash     Notify your health care provider if you experience any of the following:  persistent dizziness, light-headedness, or  visual disturbances     Notify your health care provider if you experience any of the following:  increased confusion or weakness

## 2019-01-29 NOTE — OP NOTE
Pre Op Diagnosis: Otosclerosis/ right    Post Op Diagnosis: Same    Operative Procedure: Small fenestra laser stapedotomy with use of operating microscope/ right    1/29/19                                       Surgeon: Luis Solorio M.D.    Assist:Brunner    Anesthesia: General Endotracheal    Operative Procedure in Detail:    The patient was brought to the operating room and placed in the supine position. After general endotracheal anesthesia was induced the ear was prepped and draped in the usual fashion for transcanal surgery. The operating microscope was brought onto the field and used for the remaining of the case. The ear canal was infiltrated with 1% lidocaine with 1/100,000 epinephrine. A tympanomeatal flap 8 mm long was incised and elevated to the annulus and the tympanic membrane was turned forward on the malleus. The middle ear was inspected. The patient had a mobile malleus and incus but a stapes fixed by otosclerosis. Curettage of the posterior, superior bony margin was done. The chorda tympani was mobilized and protected. The distance between the latera surface of the incus was measured and found to be 4.0 mm. The laser was brought onto the field. With a setting of 1.5 madrigal the stapedius tendon and posterior iraida were vaporized. The adry was picked away. The incudostapedial joint was sectioned and the anterior iraida down fractured and removed from the operative field. The laser was directed to the posterior center of the footplate and a .8 mm jose was created. This was finished off with a .8mm sizer. A 3.75 by .8 mm errol-platinum prosthesis was placed into the center of the fenestra and crimped securely onto the incus. Small pieces of blood soaked gelfoam were packed around the prosthesis in the oval window niche to affect a seal. The tympanomeatal flap was returned to its normal position a packed into placed with cortisporin soaked gelfoam. Sterile cotton was applied to the meatus and the  procedure was terminated. The patient tolerated the procedure well. Maybell for hearing improvement is good.

## 2019-01-29 NOTE — ANESTHESIA PREPROCEDURE EVALUATION
01/29/2019  Bianka Bernard is a 63 y.o., female.    Diagnosis: Otosclerosis of both ears [H80.93]   Pre-op diagnosis: Otosclerosis of both ears [H80.93]     Pre-operative evaluation for Procedure(s) (LRB):  STAPEDECTOMY (Right)    No diagnosis found.    Review of patient's allergies indicates:   Allergen Reactions    Sulfa (sulfonamide antibiotics) Hives       No current facility-administered medications on file prior to encounter.      Current Outpatient Medications on File Prior to Encounter   Medication Sig Dispense Refill    atorvastatin (LIPITOR) 20 MG tablet Take 1 tablet (20 mg total) by mouth once daily. 90 tablet 3    cholecalciferol, vitamin D3, (VITAMIN D3) 1,000 unit capsule Take 1,000 Units by mouth every morning.      MAGNESIUM ORAL Take by mouth every morning.      metFORMIN (GLUCOPHAGE) 1000 MG tablet Take 1 tablet (1,000 mg total) by mouth 2 (two) times daily with meals. 90 tablet 3    multivitamin with minerals tablet Take 1 tablet by mouth every morning.      naproxen (NAPROSYN) 500 MG tablet Take 1 tablet (500 mg total) by mouth 2 (two) times daily. 30 tablet 0    ondansetron (ZOFRAN) 4 MG tablet Take 1 tablet (4 mg total) by mouth every 8 (eight) hours as needed for Nausea. 30 tablet 0    VITAMIN D2 50,000 unit capsule Take 50,000 Units by mouth every 30 days.   2       Social History     Tobacco Use   Smoking Status Never Smoker   Smokeless Tobacco Never Used       Social History     Substance and Sexual Activity   Alcohol Use No       Patient Active Problem List   Diagnosis    Hematuria    Neck pain    DJD (degenerative joint disease) of cervical spine    Left shoulder pain    Primary osteoarthritis of left shoulder (mild)    Type 2 diabetes mellitus without complication, without long-term current use of insulin    Hyperlipidemia       Past Surgical History:   Procedure  Laterality Date    BREAST BIOPSY Left     core    BREAST CYST ASPIRATION Bilateral            No results for input(s): HCT in the last 72 hours.  No results for input(s): PLT in the last 72 hours.  No results for input(s): K in the last 72 hours.  No results for input(s): CREATININE in the last 72 hours.  No results for input(s): GLU in the last 72 hours.  No results for input(s): PT in the last 72 hours.                    Anesthesia Evaluation         Review of Systems  Anesthesia Hx:  No problems with previous Anesthesia    Hematology/Oncology:  Hematology Normal   Oncology Normal     Cardiovascular:   Denies Hypertension.  Denies MI.    Denies Angina. Walks several miles without difficulty   Pulmonary:  Pulmonary Normal  Denies COPD.  Denies Asthma.  Denies Shortness of breath.    Renal/:    hematuria without etiology found   Hepatic/GI:   Denies Liver Disease.    Neurological:   Denies TIA. Denies CVA. Denies Seizures.    Endocrine:   Diabetes, type 2        Physical Exam  General:  Well nourished    Airway/Jaw/Neck:  Airway Findings: Mouth Opening: Normal Tongue: Normal  General Airway Assessment: Adult, Average  Mallampati: II  TM Distance: Normal, at least 6 cm  Jaw/Neck Findings:  Neck ROM: Normal ROM  Full upper            Mental Status:  Mental Status Findings:  Cooperative, Alert and Oriented         Anesthesia Plan  Type of Anesthesia, risks & benefits discussed:  Anesthesia Type:  general  Patient's Preference:   Intra-op Monitoring Plan:   Intra-op Monitoring Plan Comments:   Post Op Pain Control Plan:   Post Op Pain Control Plan Comments: As per surgeon's plan  Induction:   IV  Beta Blocker:  Patient is not currently on a Beta-Blocker (No further documentation required).       Informed Consent: Patient understands risks and agrees with Anesthesia plan.  Questions answered. Anesthesia consent signed with patient.  ASA Score: 2     Day of Surgery Review of History & Physical:    H&P update referred  to the surgeon.         Ready For Surgery From Anesthesia Perspective.

## 2019-01-29 NOTE — PLAN OF CARE
Pre op nursing care complete. Awaiting orders. H&P update and consents pending. Anesthesia notified of elevated BP and glucose.

## 2019-01-29 NOTE — ANESTHESIA POSTPROCEDURE EVALUATION
"Anesthesia Post Evaluation    Patient: Bianka Bernard    Procedure(s) Performed: Procedure(s) (LRB):  STAPEDECTOMY (Right)    Final Anesthesia Type: general  Patient location during evaluation: PACU  Patient participation: Yes- Able to Participate  Level of consciousness: awake and alert and oriented  Post-procedure vital signs: reviewed and stable  Pain management: adequate  Airway patency: patent  PONV status at discharge: nausea (controlled)  Anesthetic complications: no      Cardiovascular status: stable  Respiratory status: unassisted, spontaneous ventilation and room air  Hydration status: euvolemic  Follow-up not needed.        Visit Vitals  BP (!) 165/72   Pulse 64   Temp 36.3 °C (97.3 °F) (Temporal)   Resp 14   Ht 5' 3" (1.6 m)   Wt 59 kg (130 lb)   SpO2 (!) 93%   Breastfeeding? No   BMI 23.03 kg/m²       Pain/Jason Score: No Data Recorded      "

## 2019-01-29 NOTE — H&P
Chief Complaint: Hearing Loss   HPI update  No changes since last seen, ready for surgery today  HPI: Has Joseph Prog HL.     AD> AS for yrs.     No Hx of inf.     Pos fam hx.     Past Medical History: Patient has a past medical history of Cataract, Diabetes mellitus, type 2, and Hematuria.     Past Surgical History: Patient has a past surgical history that includes Breast cyst aspiration (Bilateral) and Breast biopsy (Left).     Social History: Patient reports that  has never smoked. she has never used smokeless tobacco. She reports that she does not drink alcohol or use drugs.     Family History: family history includes Cataracts in her mother; Deep vein thrombosis in her father; Hypertension in her father, mother, and sister.     Medications:        Current Outpatient Medications   Medication Sig    atorvastatin (LIPITOR) 20 MG tablet Take 1 tablet (20 mg total) by mouth once daily.    metFORMIN (GLUCOPHAGE) 1000 MG tablet Take 1 tablet (1,000 mg total) by mouth 2 (two) times daily with meals.    naproxen (NAPROSYN) 500 MG tablet Take 1 tablet (500 mg total) by mouth 2 (two) times daily.    ondansetron (ZOFRAN) 4 MG tablet Take 1 tablet (4 mg total) by mouth every 8 (eight) hours as needed for Nausea.    VITAMIN D2 50,000 unit capsule Take 50,000 Units by mouth every 30 days.       No current facility-administered medications for this visit.          Allergies: Patient is allergic to sulfa (sulfonamide antibiotics).        Review of Systems   Constitutional: Negative for appetite change, chills, fatigue and fever.   HENT: Positive for hearing loss and tinnitus. Negative for congestion, ear discharge, facial swelling, postnasal drip, rhinorrhea, sore throat and trouble swallowing.    Eyes: Negative for photophobia, pain, discharge, redness, itching and visual disturbance.   Respiratory: Negative for apnea, cough, choking, chest tightness, shortness of breath, wheezing and stridor.    Cardiovascular: Negative for  chest pain and palpitations.   Gastrointestinal: Negative for abdominal pain, constipation, diarrhea, nausea and vomiting.   Musculoskeletal: Negative for arthralgias, gait problem, joint swelling, myalgias, neck pain and neck stiffness.   Skin: Negative for color change, pallor, rash and wound.   Neurological: Negative for dizziness, tremors, seizures, syncope, facial asymmetry, speech difficulty, weakness, light-headedness, numbness and headaches.   Hematological: Negative for adenopathy. Does not bruise/bleed easily.   Psychiatric/Behavioral: Negative for agitation, behavioral problems, confusion, decreased concentration, dysphoric mood, hallucinations, sleep disturbance and suicidal ideas. The patient is not nervous/anxious and is not hyperactive.        Objective:   Physical Exam   There were no vitals filed for this visit.    Constitutional: She is oriented to person, place, and time. She appears well-developed and well-nourished. She is cooperative.  Non-toxic appearance. She does not have a sickly appearance. She does not appear ill. No distress.   HENT:   Head: Normocephalic and atraumatic. Not macrocephalic and not microcephalic. Head is without raccoon's eyes, without Brothers's sign, without abrasion, without contusion, without laceration, without right periorbital erythema and without left periorbital erythema. Hair is normal.   Right Ear: Ear canal normal. No lacerations. No drainage, swelling or tenderness. No foreign bodies. No mastoid tenderness. Tympanic membrane is not injected, not scarred, not perforated, not erythematous, not retracted and not bulging. Tympanic membrane mobility is normal. No middle ear effusion. No hemotympanum. Decreased hearing is noted.   Left Ear: Ear canal normal. No lacerations. No drainage, swelling or tenderness. No foreign bodies. No mastoid tenderness. Tympanic membrane is not injected, not scarred, not perforated, not erythematous, not retracted and not bulging.  Tympanic membrane mobility is normal.  No middle ear effusion. No hemotympanum. Decreased hearing is noted.   Nose: No mucosal edema, rhinorrhea, nose lacerations, sinus tenderness, nasal deformity, septal deviation or nasal septal hematoma. No epistaxis.  No foreign bodies. Right sinus exhibits no maxillary sinus tenderness. Left sinus exhibits no maxillary sinus tenderness.       Rinne neg reed         Eyes: Conjunctivae, EOM and lids are normal. Pupils are equal, round, and reactive to light.   Neck: Normal range of motion. Neck supple. No JVD present. No tracheal tenderness and no muscular tenderness present. No neck rigidity. No tracheal deviation, no edema, no erythema and normal range of motion present. No thyroid mass and no thyromegaly present.   Cardiovascular: Normal rate and regular rhythm.   Pulmonary/Chest: Effort normal. No accessory muscle usage or stridor. No apnea, no tachypnea and no bradypnea. No respiratory distress.   Abdominal: Soft. Normal appearance.   Musculoskeletal: Normal range of motion.   Lymphadenopathy:        Head (right side): No submental, no submandibular, no tonsillar, no preauricular and no posterior auricular adenopathy present.        Head (left side): No submental, no submandibular, no tonsillar, no preauricular and no posterior auricular adenopathy present.     She has no cervical adenopathy.        Right cervical: No superficial cervical, no deep cervical and no posterior cervical adenopathy present.       Left cervical: No superficial cervical, no deep cervical and no posterior cervical adenopathy present.   Neurological: She is alert and oriented to person, place, and time. She displays no atrophy and no tremor. No cranial nerve deficit or sensory deficit. She exhibits normal muscle tone. She displays no seizure activity.   Skin: Skin is warm, dry and intact. No abrasion, no bruising, no burn, no ecchymosis, no laceration, no lesion and no rash noted. She is not  diaphoretic. No erythema. No pallor.   Psychiatric: She has a normal mood and affect. Her behavior is normal. Judgment and thought content normal. Her speech is not rapid and/or pressured and not slurred. Cognition and memory are normal. She is communicative.   Nursing note and vitals reviewed.                 Assessment:       1. Otosclerosis of both ears        Plan:         Discussed Right small fenestra stapedotomy with patient. Also reviewed all other options including observation and amplification. Risks, complications, benefits and goals reviewed. This includes: failure to improve, total loss of hearing, tinnitus, dizziness, chorda symptoms, infection, bleeding, need for revision and other potential complications. Will proceed at the patients convinience a general outpatient procedure after appropriate clearances.

## 2019-01-29 NOTE — DISCHARGE INSTRUCTIONS
Light activity, no straining, do not lift more than 5 pounds    Do not get ear wet    Try to avoid sneezing, coughing, blowing nose        Stapes Surgery  Stapes surgery can improve conductive hearing. This surgery is done to replace all or part of a damaged (fixated) stapes bone. You will be given general anesthesia or local anesthesia with sedation during surgery. The surgery takes about 1 to 2 hours.          A diseased stapes bone  The stapes bone may become affected by otosclerosis, an inherited middle ear disease. The disease creates spongy bone tissue. The tissue grows and hardens around the footplate (the part of the stapes that touches the inner ear). Hearing loss may result.  Removing bone  The first step of stapes surgery is removing the crura. This is the part of the stapes that touches the footplate. Your surgeon reaches the crura by going through the ear canal. An incision is made around the eardrum. The eardrum is held to one side. Then the crura is removed.  Preparing bone  The second step is preparing the diseased footplate for bone replacement. This will let sound vibrations reach the inner ear again. Your surgeon may make a hole in the footplate with a laser or drill. This is called a stapedotomy. Or all of the footplate may be removed and replaced with tissue. This is called a stapedectomy.  Replacing bone  The third step is replacing the crura. A manmade part (called a prosthesis) is attached to the incus bone. The prosthesis transmits sound waves to the inner ear. There are many types of prostheses. They are most often made of metal, plastic, or your own tissue. But some may use more than one of these materials.  Surgical complications  As with any surgery, this surgery has risks. You may still have some hearing loss that remains after surgery. In rare cases, the surgery can make hearing loss worse. Talk with your healthcare provider about any complications that this surgery has.   Date Last  Reviewed: 7/1/2016  © 3986-9270 [employer], [email or web address]. [address] [place] [address], [place] [address]. All rights reserved. This information is not intended as a substitute for professional medical care. Always follow your healthcare professional's instructions.

## 2019-01-30 ENCOUNTER — TELEPHONE (OUTPATIENT)
Dept: OTOLARYNGOLOGY | Facility: CLINIC | Age: 64
End: 2019-01-30

## 2019-01-30 NOTE — TELEPHONE ENCOUNTER
----- Message from Soheila Middleton sent at 1/30/2019 11:23 AM CST -----  Contact: pt   Needs Advice    Reason for call: pt is calling to speak with the nurse pt had surgery yesterday pt said she was given some medication and it hasn't helped pt said she isn't able to hold her head pt said she is dizzy pt is calling to speak with the nurse to adjust her medications or pt said she will need to come in to be seen tomorrow         Communication Preference: can you please call pt at [phone]    Additional Information: none    JACQUELINE

## 2019-01-30 NOTE — PHYSICIAN QUERY
PT Name: Bianka Bernard  MR #: [id number]     Physician Query Form - Documentation Clarification      CDS/: Melina Page               Contact information:[email or web address]    This form is a permanent document in the medical record.     Query Date: January 30, 2019    By submitting this query, we are merely seeking further clarification of documentation. Please utilize your independent clinical judgment when addressing the question(s) below.    The Medical record reflects the following:    Supporting Clinical Findings Location in Medical Record   Pre Op Diagnosis: Otosclerosis/ right     Post Op Diagnosis: Same    The patient had a mobile malleus and incus but a stapes fixed by otosclerosis. Op report   Otosclerosis of both ears        H&P                                                                            Doctor, Please further specify the otosclerosis diagnosis associated with the above clinical findings.    [  ] Cochlear    [  ] Involving       [  ] Otic capsule         [  ] Oval window             [ X ] Nonobliterative             [  ] Obliterative         [  ] Round window     [  ] Nonobliterative    [  ] Obliterative    [  ] Other specified      Provider Use Only                                                                                                                                 [ ] Clinically Undetermined

## 2019-01-31 RX ORDER — MECLIZINE HYDROCHLORIDE 25 MG/1
25 TABLET ORAL 3 TIMES DAILY PRN
Qty: 45 TABLET | Refills: 1 | Status: SHIPPED | OUTPATIENT
Start: 2019-01-31 | End: 2019-02-10

## 2019-02-12 ENCOUNTER — TELEPHONE (OUTPATIENT)
Dept: OTOLARYNGOLOGY | Facility: CLINIC | Age: 64
End: 2019-02-12

## 2019-02-20 ENCOUNTER — CLINICAL SUPPORT (OUTPATIENT)
Dept: AUDIOLOGY | Facility: CLINIC | Age: 64
End: 2019-02-20
Payer: COMMERCIAL

## 2019-02-20 ENCOUNTER — OFFICE VISIT (OUTPATIENT)
Dept: OTOLARYNGOLOGY | Facility: CLINIC | Age: 64
End: 2019-02-20
Payer: COMMERCIAL

## 2019-02-20 VITALS — BODY MASS INDEX: 23.6 KG/M2 | WEIGHT: 133.19 LBS | HEIGHT: 63 IN

## 2019-02-20 DIAGNOSIS — H80.93 OTOSCLEROSIS OF BOTH EARS: Primary | ICD-10-CM

## 2019-02-20 DIAGNOSIS — H90.0 CONDUCTIVE HEARING LOSS, BILATERAL: Primary | ICD-10-CM

## 2019-02-20 PROCEDURE — 99999 PR PBB SHADOW E&M-EST. PATIENT-LVL II: ICD-10-PCS | Mod: PBBFAC,,, | Performed by: OTOLARYNGOLOGY

## 2019-02-20 PROCEDURE — 99024 PR POST-OP FOLLOW-UP VISIT: ICD-10-PCS | Mod: S$GLB,,, | Performed by: OTOLARYNGOLOGY

## 2019-02-20 PROCEDURE — 99999 PR PBB SHADOW E&M-EST. PATIENT-LVL I: ICD-10-PCS | Mod: PBBFAC,,,

## 2019-02-20 PROCEDURE — 99999 PR PBB SHADOW E&M-EST. PATIENT-LVL I: CPT | Mod: PBBFAC,,,

## 2019-02-20 PROCEDURE — 99024 POSTOP FOLLOW-UP VISIT: CPT | Mod: S$GLB,,, | Performed by: OTOLARYNGOLOGY

## 2019-02-20 PROCEDURE — 99999 PR PBB SHADOW E&M-EST. PATIENT-LVL II: CPT | Mod: PBBFAC,,, | Performed by: OTOLARYNGOLOGY

## 2019-02-20 PROCEDURE — 92557 PR COMPREHENSIVE HEARING TEST: ICD-10-PCS | Mod: 52,S$GLB,, | Performed by: AUDIOLOGIST

## 2019-02-20 PROCEDURE — 92557 COMPREHENSIVE HEARING TEST: CPT | Mod: 52,S$GLB,, | Performed by: AUDIOLOGIST

## 2019-02-20 RX ORDER — ATORVASTATIN CALCIUM 20 MG/1
20 TABLET, FILM COATED ORAL DAILY
Refills: 3 | COMMUNITY
Start: 2019-01-29

## 2019-02-20 RX ORDER — MECLIZINE HYDROCHLORIDE 25 MG/1
TABLET ORAL
Refills: 1 | Status: ON HOLD | COMMUNITY
Start: 2019-01-31 | End: 2023-12-22 | Stop reason: HOSPADM

## 2019-02-20 NOTE — PROGRESS NOTES
2/20/2019    AUDIOLOGICAL EVALUATION:    Bianka Bernard was seen for an audiological evaluation on 2/20/2019 to monitor hearing in the right ear following surgery.  She reported improvement in right ear thresholds since surgery.    Pure tone threshold testing revealed a high frequency hearing loss in the right ear.  A speech reception threshold was obtained at 20dBHL with 100% speech discrimination score.    Recommend:  1.  Otologic evaluation.  2.  Follow up audio as needed.

## 2019-02-20 NOTE — PROGRESS NOTES
1 mo s/p stapedectomy      Packing vacuumed out of ear with microscope.    TM intact and healing well.    Patient tolerated well.    Audio shows improvement in hearing and AC > BC    RTC 3 mo for final audio    Ok to discontinue restrictions

## 2019-05-06 ENCOUNTER — OFFICE VISIT (OUTPATIENT)
Dept: OTOLARYNGOLOGY | Facility: CLINIC | Age: 64
End: 2019-05-06
Payer: COMMERCIAL

## 2019-05-06 VITALS — HEIGHT: 63 IN | BODY MASS INDEX: 23.86 KG/M2 | WEIGHT: 134.69 LBS

## 2019-05-06 DIAGNOSIS — Z09 FOLLOW-UP EXAMINATION AFTER EAR SURGERY: Primary | ICD-10-CM

## 2019-05-06 PROCEDURE — 99499 UNLISTED E&M SERVICE: CPT | Mod: S$GLB,,, | Performed by: OTOLARYNGOLOGY

## 2019-05-06 PROCEDURE — 99499 NO LOS: ICD-10-PCS | Mod: S$GLB,,, | Performed by: OTOLARYNGOLOGY

## 2019-05-06 PROCEDURE — 99999 PR PBB SHADOW E&M-EST. PATIENT-LVL III: ICD-10-PCS | Mod: PBBFAC,,, | Performed by: OTOLARYNGOLOGY

## 2019-05-06 PROCEDURE — 99999 PR PBB SHADOW E&M-EST. PATIENT-LVL III: CPT | Mod: PBBFAC,,, | Performed by: OTOLARYNGOLOGY

## 2019-05-06 NOTE — PROGRESS NOTES
5 mo s/p stapedectomy      Packing vacuumed out of ear with microscope.    TM intact and healing well.    Patient tolerated well.    Audio shows improvement in hearing and AC > BC            Ok to discontinue restrictions    RTC 1 yr.

## 2021-07-03 NOTE — PROGRESS NOTES
Reviewed patient status with Dr. Solorio over the phone.  Spoke to Dr. Brunner about dizziness issues.  MDs discussed the patient and interventions and received verbal order for 12.5 mg Iv phenergan and 2 mg of valium p.o.  BG checked; 327.  Report given to JUSTINE Woodruff who is notifying anesthesia of BG.   
Assisted patient up to restroom. C/o dizziness and nausea. Small amount of yellow emesis. Meclizine given as ordered. Comfort measures provided.  
Assumed care of patient who has been recovering in phase II for over 4 hours.  Patient states she is dizzy and does not want to leave.  Requested  at son to bedside.  Provided reassurance.  Reviewed AVS and meds.  Paged ENT to assess patient, as she will not get out of bed to get ready to go home due to dizziness.  Paged ENT on call who said they are available if surgical team cannot be reached.  Patient awake and alert with stable vital signs.  WCTM.  
Dr. Solorio to bedside to assess patient.  
Insulin 3u given as ordered for accucheck 256.   
Phenergan not present at bedside.  Pharmacy contacted - medication has not been sent, and may still be in process.  Pharmacy tech looking into status.  Was told that medication will be sent as soon as possible.  
Pt blood glucose result - 329.  Anesthesia desk contacted.  Waiting for intervention, anesthesia to call back.  
Pt continues to c/o dizziness. When asked if pt is ready to go home she states not yet.   
Pt up and ambulating to the bathroom with assistance.  No phenergan administered.  Pt states she feels ready for d/c after using the restroom. Pt voided without problem.  All questions answered.    
OT and PT in front
no AD

## 2023-12-12 ENCOUNTER — HOSPITAL ENCOUNTER (EMERGENCY)
Facility: HOSPITAL | Age: 68
Discharge: HOME | End: 2023-12-12
Attending: STUDENT IN AN ORGANIZED HEALTH CARE EDUCATION/TRAINING PROGRAM
Payer: COMMERCIAL

## 2023-12-12 VITALS
OXYGEN SATURATION: 100 % | WEIGHT: 114 LBS | BODY MASS INDEX: 20.19 KG/M2 | RESPIRATION RATE: 18 BRPM | TEMPERATURE: 98 F | SYSTOLIC BLOOD PRESSURE: 158 MMHG | DIASTOLIC BLOOD PRESSURE: 63 MMHG | HEART RATE: 72 BPM

## 2023-12-12 DIAGNOSIS — S42.291A OTHER CLOSED DISPLACED FRACTURE OF PROXIMAL END OF RIGHT HUMERUS, INITIAL ENCOUNTER: Primary | ICD-10-CM

## 2023-12-12 DIAGNOSIS — T14.90XA TRAUMA: ICD-10-CM

## 2023-12-12 PROBLEM — S42.201A CLOSED FRACTURE OF PROXIMAL END OF RIGHT HUMERUS: Status: ACTIVE | Noted: 2023-12-12

## 2023-12-12 LAB — POCT GLUCOSE: 98 MG/DL (ref 70–110)

## 2023-12-12 PROCEDURE — 25000003 PHARM REV CODE 250: Performed by: STUDENT IN AN ORGANIZED HEALTH CARE EDUCATION/TRAINING PROGRAM

## 2023-12-12 PROCEDURE — 63600175 PHARM REV CODE 636 W HCPCS: Performed by: STUDENT IN AN ORGANIZED HEALTH CARE EDUCATION/TRAINING PROGRAM

## 2023-12-12 PROCEDURE — 90715 TDAP VACCINE 7 YRS/> IM: CPT | Performed by: STUDENT IN AN ORGANIZED HEALTH CARE EDUCATION/TRAINING PROGRAM

## 2023-12-12 PROCEDURE — 90471 IMMUNIZATION ADMIN: CPT | Performed by: STUDENT IN AN ORGANIZED HEALTH CARE EDUCATION/TRAINING PROGRAM

## 2023-12-12 PROCEDURE — 99285 EMERGENCY DEPT VISIT HI MDM: CPT | Mod: 25

## 2023-12-12 PROCEDURE — 82962 GLUCOSE BLOOD TEST: CPT

## 2023-12-12 RX ORDER — OXYCODONE HYDROCHLORIDE 5 MG/1
5 TABLET ORAL
Status: COMPLETED | OUTPATIENT
Start: 2023-12-12 | End: 2023-12-12

## 2023-12-12 RX ORDER — DICLOFENAC SODIUM 50 MG/1
100 TABLET, DELAYED RELEASE ORAL 2 TIMES DAILY
COMMUNITY
End: 2023-12-13

## 2023-12-12 RX ORDER — ONDANSETRON 4 MG/1
4 TABLET, ORALLY DISINTEGRATING ORAL
Status: COMPLETED | OUTPATIENT
Start: 2023-12-12 | End: 2023-12-12

## 2023-12-12 RX ORDER — OXYCODONE HYDROCHLORIDE 5 MG/1
5 TABLET ORAL EVERY 6 HOURS PRN
Qty: 10 TABLET | Refills: 0 | Status: SHIPPED | OUTPATIENT
Start: 2023-12-12 | End: 2023-12-15 | Stop reason: SDUPTHER

## 2023-12-12 RX ORDER — OXYCODONE HYDROCHLORIDE 5 MG/1
5 TABLET ORAL ONCE
Status: COMPLETED | OUTPATIENT
Start: 2023-12-12 | End: 2023-12-12

## 2023-12-12 RX ADMIN — OXYCODONE HYDROCHLORIDE 5 MG: 5 TABLET ORAL at 09:12

## 2023-12-12 RX ADMIN — ONDANSETRON 4 MG: 4 TABLET, ORALLY DISINTEGRATING ORAL at 09:12

## 2023-12-12 RX ADMIN — OXYCODONE HYDROCHLORIDE 5 MG: 5 TABLET ORAL at 06:12

## 2023-12-12 RX ADMIN — TETANUS TOXOID, REDUCED DIPHTHERIA TOXOID AND ACELLULAR PERTUSSIS VACCINE, ADSORBED 0.5 ML: 5; 2.5; 8; 8; 2.5 SUSPENSION INTRAMUSCULAR at 08:12

## 2023-12-12 NOTE — ED NOTES
Patient agrees to cut shirt as she is wearing the same shirt as Sunday, used saline to remove shirt from dried blood on elbow.
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
## 2023-12-12 NOTE — SUBJECTIVE & OBJECTIVE
Past Medical History:   Diagnosis Date    Cataract     Diabetes mellitus, type 2     Hematuria     Long hx of this.  Being followed by Nephrologist, Dr. Burgos at [facility].       Past Surgical History:   Procedure Laterality Date    BREAST BIOPSY Left     core    BREAST CYST ASPIRATION Bilateral     STAPEDECTOMY Right 1/29/2019    Procedure: STAPEDECTOMY;  Surgeon: Luis Solorio MD;  Location: [facility] OR [address] [place] [address];  Service: ENT;  Laterality: Right;       Review of patient's allergies indicates:   Allergen Reactions    Sulfa (sulfonamide antibiotics) Hives       Current Facility-Administered Medications   Medication    Tdap (BOOSTRIX) vaccine injection 0.5 mL     Current Outpatient Medications   Medication Sig    atorvastatin (LIPITOR) 20 MG tablet TK 1 T PO AFTER MEALS    empagliflozin (JARDIANCE) 10 mg tablet Take 10 mg by mouth once daily.    metFORMIN (GLUCOPHAGE) 1000 MG tablet Take 1 tablet (1,000 mg total) by mouth 2 (two) times daily with meals.    cholecalciferol, vitamin D3, (VITAMIN D3) 1,000 unit capsule Take 1,000 Units by mouth every morning.    diclofenac (VOLTAREN) 50 MG EC tablet Take 100 mg by mouth 2 (two) times daily.    HYDROcodone-acetaminophen (NORCO) 5-325 mg per tablet Take 1 tablet by mouth every 6 (six) hours as needed.    MAGNESIUM ORAL Take by mouth every morning.    meclizine (ANTIVERT) 12.5 mg tablet Take 1 tablet (12.5 mg total) by mouth 3 (three) times daily as needed for Dizziness.    meclizine (ANTIVERT) 25 mg tablet     multivitamin with minerals tablet Take 1 tablet by mouth every morning.    neomycin-polymyxin-hydrocortisone (CORTISPORIN) 3.5-10,000-1 mg/mL-unit/mL-% otic suspension Place 3 drops into the right ear 3 (three) times daily.    promethazine (PHENERGAN) 12.5 MG Tab Take 1 tablet (12.5 mg total) by mouth every 6 (six) hours as needed (nausea/vomiting).     Family History       Problem Relation (Age of Onset)    Cataracts Mother    Deep vein thrombosis Father     Hypertension Mother, Father, Sister          Tobacco Use    Smoking status: Never    Smokeless tobacco: Never   Substance and Sexual Activity    Alcohol use: No    Drug use: No    Sexual activity: Yes     Partners: Male     ROS  Constitutional: negative for fevers  Eyes: negative visual changes  ENT: negative for hearing loss  Respiratory: negative for dyspnea  Cardiovascular: negative for chest pain  Gastrointestinal: negative for abdominal pain  Genitourinary: negative for dysuria  Neurological: negative for headaches  Behavioral/Psych: negative for hallucinations  Endocrine: negative for temperature intolerance    Objective:     Vital Signs (Most Recent):  Temp: 98.4 °F (36.9 °C) (12/12/23 0907)  Pulse: 89 (12/12/23 0907)  Resp: 18 (12/12/23 0946)  BP: (!) 188/79 (12/12/23 0907)  SpO2: 98 % (12/12/23 0907) Vital Signs (24h Range):  Temp:  [98.4 °F (36.9 °C)] 98.4 °F (36.9 °C)  Pulse:  [89] 89  Resp:  [18] 18  SpO2:  [98 %] 98 %  BP: (188)/(79) 188/79     Weight: 51.7 kg (114 lb)     Body mass index is 20.19 kg/m².    No intake or output data in the 24 hours ending 12/12/23 1347     Ortho/SPM Exam  General:  no acute distress, appears stated age   Neuro: alert and oriented x3  Psych: normal mood  Head: normocephalic, atraumatic.  Eyes: no scleral icterus  Mouth: moist mucous membranes  Cardiovascular: extremities warm and well perfused  Lungs: breathing comfortably, equal chest rise bilat  Skin: clean, dry, intact (any exceptions noted in below musculoskeletal exam)    MSK:  RUE:  - Skin intact throughout, no open wounds  - Swelling about upper arm  - Significant ecchymoses along posterior and anterior aspect of proximal arm  - Very TTP along proximal humerus  - AROM and PROM of the elbow, wrist, and hand intact without pain  - ROM of shoulder deferred due to known fx  - Axillary/AIN/PIN/Radial/Median/Ulnar Nerves assessed in isolation without deficit  - SILT throughout  - Compartments soft  - Radial artery  palpated   - Capillary Refill <3s    LUE:  - Skin intact throughout, no open wounds  - No swelling  - No ecchymosis, erythema, or signs of cellulitis  - NonTTP throughout  - AROM and PROM of the shoulder, elbow, wrist, and hand intact without pain  - Axillary/AIN/PIN/Radial/Median/Ulnar Nerves assessed in isolation without deficit  - SILT throughout  - Compartments soft  - Radial artery palpated   - Capillary Refill <3s    RLE:  - Skin intact throughout, no open wounds  - No swelling  - No ecchymosis, erythema, or signs of cellulitis  - NonTTP throughout  - AROM and PROM of the hip, knee, ankle, and foot intact without pain  - TA/EHL/Gastroc/FHL assessed in isolation without deficit  - SILT throughout  - Compartments soft  - DP and PT palpated  - Capillary Refill <3s    LLE:  - Skin intact throughout, no open wounds  - No swelling  - No ecchymosis, erythema, or signs of cellulitis  - NonTTP throughout  - AROM and PROM of the hip, knee, ankle, and foot intact without pain  - TA/EHL/Gastroc/FHL assessed in isolation without deficit  - SILT throughout  - Compartments soft  - DP and PT palpated  - Capillary Refill <3s       Significant Labs: All pertinent labs within the past 24 hours have been reviewed.    Significant Imaging: I have reviewed and interpreted all pertinent imaging results/findings.  XR R shoulder: proximal humerus fx   CT R shoulder: proximal humerus fracture, head split with significant comminution, anterior and inferior subluxation of shaft
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
## 2023-12-13 ENCOUNTER — LAB VISIT (OUTPATIENT)
Dept: LAB | Facility: HOSPITAL | Age: 68
End: 2023-12-13
Attending: NURSE PRACTITIONER
Payer: COMMERCIAL

## 2023-12-13 DIAGNOSIS — Z01.818 PRE-OP EXAMINATION: Primary | ICD-10-CM

## 2023-12-13 DIAGNOSIS — S42.291A OTHER CLOSED DISPLACED FRACTURE OF PROXIMAL END OF RIGHT HUMERUS, INITIAL ENCOUNTER: ICD-10-CM

## 2023-12-13 DIAGNOSIS — Z01.818 PRE-OP EXAMINATION: ICD-10-CM

## 2023-12-13 LAB
ALBUMIN SERPL BCP-MCNC: 3.6 G/DL (ref 3.5–5.2)
ALP SERPL-CCNC: 71 U/L (ref 55–135)
ALT SERPL W/O P-5'-P-CCNC: 18 U/L (ref 10–44)
ANION GAP SERPL CALC-SCNC: 13 MMOL/L (ref 8–16)
AST SERPL-CCNC: 20 U/L (ref 10–40)
BASOPHILS # BLD AUTO: 0.04 K/UL (ref 0–0.2)
BASOPHILS NFR BLD: 0.5 % (ref 0–1.9)
BILIRUB SERPL-MCNC: 0.9 MG/DL (ref 0.1–1)
BUN SERPL-MCNC: 15 MG/DL (ref 8–23)
CALCIUM SERPL-MCNC: 10.1 MG/DL (ref 8.7–10.5)
CHLORIDE SERPL-SCNC: 105 MMOL/L (ref 95–110)
CO2 SERPL-SCNC: 24 MMOL/L (ref 23–29)
CREAT SERPL-MCNC: 0.8 MG/DL (ref 0.5–1.4)
DIFFERENTIAL METHOD: ABNORMAL
EOSINOPHIL # BLD AUTO: 0.1 K/UL (ref 0–0.5)
EOSINOPHIL NFR BLD: 0.9 % (ref 0–8)
ERYTHROCYTE [DISTWIDTH] IN BLOOD BY AUTOMATED COUNT: 14.7 % (ref 11.5–14.5)
EST. GFR  (NO RACE VARIABLE): >60 ML/MIN/1.73 M^2
GLUCOSE SERPL-MCNC: 233 MG/DL (ref 70–110)
HCT VFR BLD AUTO: 39.3 % (ref 37–48.5)
HGB BLD-MCNC: 11.8 G/DL (ref 12–16)
IMM GRANULOCYTES # BLD AUTO: 0.02 K/UL (ref 0–0.04)
IMM GRANULOCYTES NFR BLD AUTO: 0.3 % (ref 0–0.5)
LYMPHOCYTES # BLD AUTO: 1.3 K/UL (ref 1–4.8)
LYMPHOCYTES NFR BLD: 16.4 % (ref 18–48)
MCH RBC QN AUTO: 26.9 PG (ref 27–31)
MCHC RBC AUTO-ENTMCNC: 30 G/DL (ref 32–36)
MCV RBC AUTO: 90 FL (ref 82–98)
MONOCYTES # BLD AUTO: 0.5 K/UL (ref 0.3–1)
MONOCYTES NFR BLD: 6.2 % (ref 4–15)
NEUTROPHILS # BLD AUTO: 5.9 K/UL (ref 1.8–7.7)
NEUTROPHILS NFR BLD: 75.7 % (ref 38–73)
NRBC BLD-RTO: 0 /100 WBC
PLATELET # BLD AUTO: 335 K/UL (ref 150–450)
PMV BLD AUTO: 10.6 FL (ref 9.2–12.9)
POTASSIUM SERPL-SCNC: 4.7 MMOL/L (ref 3.5–5.1)
PROT SERPL-MCNC: 6.9 G/DL (ref 6–8.4)
RBC # BLD AUTO: 4.38 M/UL (ref 4–5.4)
SODIUM SERPL-SCNC: 142 MMOL/L (ref 136–145)
WBC # BLD AUTO: 7.76 K/UL (ref 3.9–12.7)

## 2023-12-13 PROCEDURE — 85025 COMPLETE CBC W/AUTO DIFF WBC: CPT | Performed by: NURSE PRACTITIONER

## 2023-12-13 PROCEDURE — 80053 COMPREHEN METABOLIC PANEL: CPT | Performed by: NURSE PRACTITIONER

## 2023-12-13 PROCEDURE — 36415 COLL VENOUS BLD VENIPUNCTURE: CPT | Mod: PO | Performed by: NURSE PRACTITIONER

## 2023-12-13 RX ORDER — ORAL SEMAGLUTIDE 3 MG/1
3 TABLET ORAL EVERY MORNING
COMMUNITY
Start: 2023-09-30

## 2023-12-13 NOTE — PRE-PROCEDURE INSTRUCTIONS
PreOp Instructions given:   - Verbal medication information (what to hold and what to take)   - NPO guidelines 2300  - Arrival place directions given; time to be given the day before procedure by the   Surgeon's Office Pt will report to Ortho Clinic at 0830 - prepared for Sx on 12/14/23 - assessment will determine if SX will be performed Thursday or Friday  - Bathing with antibacterial soap   - Don't wear any jewelry or bring any valuables AM of surgery   - No makeup or moisturizer to face   - No perfume/cologne, powder, lotions or aftershave   Pt. verbalized understanding.   Pt denies any h/o Anesthesia/Sedation complications or side effects.  Patient does not know arrival time.  Explained that this information comes from the surgeon's office and if they haven't heard from them by 2 or 3 pm to call the office.  Patient stated an understanding.
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
## 2023-12-13 NOTE — PROVIDER PROGRESS NOTES - EMERGENCY DEPT.
Encounter Date: 12/12/2023    ED Physician Progress Notes        ED Physician Hand-off Note:    ED Course: I assumed care of patient from off-going ED physician, Dr. Mays.  Briefly, Patient is presented for R arm pain 2/2 fall     At the time of signout plan was pending ortho recs. CT R arm with  proximal humerus fracture, also with right lower lobe ground-glass opacities suggestive of atelectasis/ pulmonary contusion/ hemorrhage.  Isolated right shoulder pain, denies any chest pain or shortness of breath or pleuritic chest pain.  Evaluated by ortho, will be seen in 1 week.  Placed in sling.  Given oxycodone for outpatient pain control.    Disposition:   Discharge with orthopedic follow-up    Patient comfortable with  discharge. Patient counseled regarding exam, results, diagnosis, treatment, and plan.    Impression:  proximal humeral comminuted fracture secondary to trauma    Final diagnoses:  [T14.90XA] Trauma

## 2023-12-13 NOTE — DISCHARGE INSTRUCTIONS
Seen in the ER today for your right shoulder pain, you have a right-sided fracture in the humerus, please follow-up with orthopedics in 1 week, return to the ER if you have severe shortness breath, chest pain, right arm numbness.    Thank you for coming to our Emergency Department today. It is important to remember that some problems or medical conditions are difficult to diagnose and may not be found or addressed during your Emergency Department visit.  These conditions often start with non-specific symptoms and can only be diagnosed on follow up visits with your primary care physician or specialist when the symptoms continue or change. Please remember that all medical conditions can change, and we cannot predict how you will be feeling tomorrow or the next day. Return to the ER with any questions/concerns, new/concerning symptoms, worsening or failure to improve.       Be sure to follow up with your primary care doctor and review all labs/imaging/tests that were performed during your ER visit with them. It is very common for us to identify non-emergent incidental findings which must be followed up with your primary care physician.  Some labs/imaging/tests may be outside of the normal range, and require non-emergent follow-up and/or further investigation/treatment/procedures/testing to help diagnose/exclude/prevent complications or other potentially serious medical conditions. Some abnormalities may not have been discussed or addressed during your ER visit. Some lab results may not return during your ER visit but can be accessible by downloading the free [facility] My[facility] bruna or by visiting [email or web address] . It is important for you to review all labs/imaging/tests which are outside of the normal range with your physician.    An ER visit does not replace a primary care visit, and many screening tests or follow-up tests cannot be ordered by an ER doctor or performed by the ER. Some tests may even require  pre-approval.    If you do not have a primary care doctor, you may contact the one listed on your discharge paperwork or you may also call the [facility] Appointment Desk at [phone] , or [address] [place] [address] at  [phone] to schedule an appointment, or establish care with a primary care doctor or even a specialist and to obtain information about local resources. It is important to your health that you have a primary care doctor.    Please take all medications as directed. We have done our best to select a medication for you that will treat your condition however, all medications may potentially have side-effects and it is impossible to predict which medications may give you side-effects or what those side-effects (if any) those medications may give you.  If you feel that you are having a negative effect or side-effect of any medication you should stop taking those medications immediately and seek medical attention. If you feel that you are having a life-threatening reaction call 911.        Do not drive, swim, climb to height, take a bath, operate heavy machinery, drink alcohol or take potentially sedating medications, sign any legal documents or make any important decisions for 24 hours if you have received any pain medications, sedatives or mood altering drugs during your ER visit or within 24 hours of taking them if they have been prescribed to you.     You can find additional resources for Dentists, hearing aids, durable medical equipment, low cost pharmacies and other resources at [email or web address]

## 2023-12-14 ENCOUNTER — TELEPHONE (OUTPATIENT)
Dept: ORTHOPEDICS | Facility: CLINIC | Age: 68
End: 2023-12-14

## 2023-12-14 ENCOUNTER — HOSPITAL ENCOUNTER (OUTPATIENT)
Dept: CARDIOLOGY | Facility: CLINIC | Age: 68
Discharge: HOME | End: 2023-12-14
Payer: COMMERCIAL

## 2023-12-14 ENCOUNTER — ANESTHESIA EVENT (OUTPATIENT)
Dept: SURGERY | Facility: HOSPITAL | Age: 68
End: 2023-12-14
Payer: COMMERCIAL

## 2023-12-14 ENCOUNTER — OFFICE VISIT (OUTPATIENT)
Dept: ORTHOPEDICS | Facility: CLINIC | Age: 68
End: 2023-12-14
Payer: COMMERCIAL

## 2023-12-14 ENCOUNTER — HOSPITAL ENCOUNTER (OUTPATIENT)
Dept: RADIOLOGY | Facility: HOSPITAL | Age: 68
Discharge: HOME | End: 2023-12-14
Attending: NURSE PRACTITIONER
Payer: COMMERCIAL

## 2023-12-14 ENCOUNTER — PATIENT MESSAGE (OUTPATIENT)
Dept: ORTHOPEDICS | Facility: CLINIC | Age: 68
End: 2023-12-14

## 2023-12-14 VITALS — WEIGHT: 114 LBS | HEIGHT: 63 IN | BODY MASS INDEX: 20.2 KG/M2

## 2023-12-14 DIAGNOSIS — Z01.818 PRE-OP EXAMINATION: Primary | ICD-10-CM

## 2023-12-14 DIAGNOSIS — S42.291A OTHER CLOSED DISPLACED FRACTURE OF PROXIMAL END OF RIGHT HUMERUS, INITIAL ENCOUNTER: ICD-10-CM

## 2023-12-14 DIAGNOSIS — Z01.818 PRE-OP EXAMINATION: ICD-10-CM

## 2023-12-14 PROCEDURE — 3008F BODY MASS INDEX DOCD: CPT | Mod: CPTII,S$GLB,, | Performed by: NURSE PRACTITIONER

## 2023-12-14 PROCEDURE — 99999 PR PBB SHADOW E&M-EST. PATIENT-LVL III: CPT | Mod: PBBFAC,,, | Performed by: NURSE PRACTITIONER

## 2023-12-14 PROCEDURE — 93010 EKG 12-LEAD: ICD-10-PCS | Mod: S$GLB,,, | Performed by: INTERNAL MEDICINE

## 2023-12-14 PROCEDURE — 1101F PT FALLS ASSESS-DOCD LE1/YR: CPT | Mod: CPTII,S$GLB,, | Performed by: NURSE PRACTITIONER

## 2023-12-14 PROCEDURE — 1159F PR MEDICATION LIST DOCUMENTED IN MEDICAL RECORD: ICD-10-PCS | Mod: CPTII,S$GLB,, | Performed by: NURSE PRACTITIONER

## 2023-12-14 PROCEDURE — 3288F FALL RISK ASSESSMENT DOCD: CPT | Mod: CPTII,S$GLB,, | Performed by: NURSE PRACTITIONER

## 2023-12-14 PROCEDURE — 99204 OFFICE O/P NEW MOD 45 MIN: CPT | Mod: S$GLB,,, | Performed by: NURSE PRACTITIONER

## 2023-12-14 PROCEDURE — 93005 ELECTROCARDIOGRAM TRACING: CPT | Mod: S$GLB,,, | Performed by: NURSE PRACTITIONER

## 2023-12-14 PROCEDURE — 4010F PR ACE/ARB THEARPY RXD/TAKEN: ICD-10-PCS | Mod: CPTII,S$GLB,, | Performed by: NURSE PRACTITIONER

## 2023-12-14 PROCEDURE — 1101F PR PT FALLS ASSESS DOC 0-1 FALLS W/OUT INJ PAST YR: ICD-10-PCS | Mod: CPTII,S$GLB,, | Performed by: NURSE PRACTITIONER

## 2023-12-14 PROCEDURE — 71046 XR CHEST PA AND LATERAL: ICD-10-PCS | Mod: 26,,, | Performed by: RADIOLOGY

## 2023-12-14 PROCEDURE — 3288F PR FALLS RISK ASSESSMENT DOCUMENTED: ICD-10-PCS | Mod: CPTII,S$GLB,, | Performed by: NURSE PRACTITIONER

## 2023-12-14 PROCEDURE — 71046 X-RAY EXAM CHEST 2 VIEWS: CPT | Mod: TC,FY

## 2023-12-14 PROCEDURE — 1159F MED LIST DOCD IN RCRD: CPT | Mod: CPTII,S$GLB,, | Performed by: NURSE PRACTITIONER

## 2023-12-14 PROCEDURE — 4010F ACE/ARB THERAPY RXD/TAKEN: CPT | Mod: CPTII,S$GLB,, | Performed by: NURSE PRACTITIONER

## 2023-12-14 PROCEDURE — 93005 EKG 12-LEAD: ICD-10-PCS | Mod: S$GLB,,, | Performed by: NURSE PRACTITIONER

## 2023-12-14 PROCEDURE — 71046 X-RAY EXAM CHEST 2 VIEWS: CPT | Mod: 26,,, | Performed by: RADIOLOGY

## 2023-12-14 PROCEDURE — 3008F PR BODY MASS INDEX (BMI) DOCUMENTED: ICD-10-PCS | Mod: CPTII,S$GLB,, | Performed by: NURSE PRACTITIONER

## 2023-12-14 PROCEDURE — 99204 PR OFFICE/OUTPT VISIT, NEW, LEVL IV, 45-59 MIN: ICD-10-PCS | Mod: S$GLB,,, | Performed by: NURSE PRACTITIONER

## 2023-12-14 PROCEDURE — 93010 ELECTROCARDIOGRAM REPORT: CPT | Mod: S$GLB,,, | Performed by: INTERNAL MEDICINE

## 2023-12-14 PROCEDURE — 1160F PR REVIEW ALL MEDS BY PRESCRIBER/CLIN PHARMACIST DOCUMENTED: ICD-10-PCS | Mod: CPTII,S$GLB,, | Performed by: NURSE PRACTITIONER

## 2023-12-14 PROCEDURE — 99999 PR PBB SHADOW E&M-EST. PATIENT-LVL III: ICD-10-PCS | Mod: PBBFAC,,, | Performed by: NURSE PRACTITIONER

## 2023-12-14 PROCEDURE — 1160F RVW MEDS BY RX/DR IN RCRD: CPT | Mod: CPTII,S$GLB,, | Performed by: NURSE PRACTITIONER

## 2023-12-14 NOTE — TELEPHONE ENCOUNTER
Left a voice mail for pt to return my call regarding her sx scheduled for tomorrow.  Pending a return call from pt.   Per surgeon, pending sx date / time confirmation.
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
## 2023-12-14 NOTE — TELEPHONE ENCOUNTER
Left a detailed voice mail for pt and her spouse to return my call regarding rescheduling her sx originally scheduled for tomorrow.

## 2023-12-15 ENCOUNTER — ANESTHESIA (OUTPATIENT)
Dept: SURGERY | Facility: HOSPITAL | Age: 68
End: 2023-12-15
Payer: COMMERCIAL

## 2023-12-15 DIAGNOSIS — S42.291A OTHER CLOSED DISPLACED FRACTURE OF PROXIMAL END OF RIGHT HUMERUS, INITIAL ENCOUNTER: Primary | ICD-10-CM

## 2023-12-15 RX ORDER — OXYCODONE HYDROCHLORIDE 5 MG/1
5 TABLET ORAL EVERY 4 HOURS PRN
Qty: 42 TABLET | Refills: 0 | Status: SHIPPED | OUTPATIENT
Start: 2023-12-15

## 2023-12-15 RX ORDER — ACETAMINOPHEN 500 MG
500 TABLET ORAL EVERY 6 HOURS PRN
Qty: 90 TABLET | Refills: 0 | Status: SHIPPED | OUTPATIENT
Start: 2023-12-15

## 2023-12-15 RX ORDER — METHOCARBAMOL 500 MG/1
500 TABLET, FILM COATED ORAL 4 TIMES DAILY
Qty: 40 TABLET | Refills: 0 | Status: SHIPPED | OUTPATIENT
Start: 2023-12-15 | End: 2023-12-25

## 2023-12-15 NOTE — PROGRESS NOTES
I spoke with patient, she is upset her surgery has been delayed.  She states she does not have enough pain medication to last her until her surgery date.  I informed her I will refill her medication and also prescribe Tylenol and methocarbamol in addition to the oxycodone.  Patient verbalized understanding.

## 2023-12-19 ENCOUNTER — TELEPHONE (OUTPATIENT)
Dept: ORTHOPEDICS | Facility: CLINIC | Age: 68
End: 2023-12-19
Payer: COMMERCIAL

## 2023-12-19 NOTE — TELEPHONE ENCOUNTER
----- Message from Rach Gudino MA sent at 12/19/2023 12:04 PM CST -----    ----- Message -----  From: Mary Macias MA  Sent: 12/19/2023  11:05 AM CST  To: [facility] Staff    Good morning,      The above patient daughter is reaching out to find out what time is surgery scheduled for.    Pt daughter name is Darrin Bernard contact [phone]

## 2023-12-19 NOTE — ANESTHESIA PREPROCEDURE EVALUATION
[facility]  Anesthesia Pre-Operative Evaluation         Patient Name: Bianka Bernard  YOB: 1955  MRN: [id number]    SUBJECTIVE:     Pre-operative evaluation for Procedure(s) (LRB):  ARTHROPLASTY, SHOULDER (reverse for fx) - real beach chair. Antoine RTSA fx stem. Cement. (Right)     12/19/2023    Bianka Bernard is a 68 y.o. female w/ a significant PMHx of cervical DJD, T2DM, and R proximal humerus fracture dislocation.    Patient now presents for the above procedure(s).    NO PREVIOUS ECHOs.     Prev airway: Method of Intubation: Direct laryngoscopy; Inserted by: CRNA; Airway Device: Endotracheal Tube; Mask Ventilation: Easy; Intubated: Postinduction; Blade: Gonzales #2; Airway Device Size: 7.5; Style: Cuffed; Inflation Amount: 8; Placement Verified By: Auscultation, Capnometry, ETT Condensation; Grade: Grade I; Complicating Factors: None       Patient Active Problem List   Diagnosis    Hematuria    Neck pain    DJD (degenerative joint disease) of cervical spine    Left shoulder pain    Primary osteoarthritis of left shoulder (mild)    Type 2 diabetes mellitus without complication, without long-term current use of insulin    Hyperlipidemia    Otosclerosis of right ear    Closed fracture of proximal end of right humerus       Review of patient's allergies indicates:   Allergen Reactions    Sulfa (sulfonamide antibiotics) Hives       Current Inpatient Medications:      No current facility-administered medications on file prior to encounter.     Current Outpatient Medications on File Prior to Encounter   Medication Sig Dispense Refill    RYBELSUS 3 mg tablet Take 3 mg by mouth every morning.      atorvastatin (LIPITOR) 20 MG tablet Take 20 mg by mouth once daily.  3    cholecalciferol, vitamin D3, (VITAMIN D3) 1,000 unit capsule Take 1,000 Units by mouth every morning.      empagliflozin (JARDIANCE) 10 mg tablet Take 10 mg by mouth every evening.      MAGNESIUM ORAL Take by mouth every morning.       meclizine (ANTIVERT) 12.5 mg tablet Take 1 tablet (12.5 mg total) by mouth 3 (three) times daily as needed for Dizziness. (Patient not taking: Reported on 12/13/2023) 10 tablet 0    meclizine (ANTIVERT) 25 mg tablet   1    metFORMIN (GLUCOPHAGE) 1000 MG tablet Take 1 tablet (1,000 mg total) by mouth 2 (two) times daily with meals. 90 tablet 3    neomycin-polymyxin-hydrocortisone (CORTISPORIN) 3.5-10,000-1 mg/mL-unit/mL-% otic suspension Place 3 drops into the right ear 3 (three) times daily. (Patient not taking: Reported on 12/13/2023) 19 mL 3       Past Surgical History:   Procedure Laterality Date    BREAST BIOPSY Left     core    BREAST CYST ASPIRATION Bilateral     STAPEDECTOMY Right 1/29/2019    Procedure: STAPEDECTOMY;  Surgeon: Luis Solorio MD;  Location: [facility] OR [address] [place] [address];  Service: ENT;  Laterality: Right;       Social History     Socioeconomic History    Marital status:    Tobacco Use    Smoking status: Never    Smokeless tobacco: Never   Substance and Sexual Activity    Alcohol use: No    Drug use: No    Sexual activity: Yes     Partners: Male       OBJECTIVE:     Vital Signs Range (Last 24H):         Significant Labs:  Lab Results   Component Value Date    WBC 7.76 12/13/2023    HGB 11.8 (L) 12/13/2023    HCT 39.3 12/13/2023     12/13/2023    CHOL 125 07/11/2018    TRIG 130 07/11/2018    HDL 57 07/11/2018    ALT 18 12/13/2023    AST 20 12/13/2023     12/13/2023    K 4.7 12/13/2023     12/13/2023    CREATININE 0.8 12/13/2023    BUN 15 12/13/2023    CO2 24 12/13/2023    TSH 0.893 07/11/2018    HGBA1C 8.3 (H) 07/11/2018       Diagnostic Studies: No relevant studies.    EKG:   Results for orders placed or performed during the hospital encounter of 12/14/23   EKG 12-lead    Collection Time: 12/14/23 10:18 AM    Narrative    Test Reason : Z01.818,S42.291A,    Vent. Rate : 073 BPM     Atrial Rate : 073 BPM     P-R Int : 142 ms          QRS Dur : 096 ms      QT Int : 404 ms        P-R-T Axes : 062 073 061 degrees     QTc Int : 445 ms    Normal sinus rhythm  Normal ECG  No previous ECGs available  Confirmed by Blake Winn MD ([id number]) on 12/14/2023 11:26:48 AM    Referred By: KALIE CONRAD           Confirmed By:Blake Winn MD       2D ECHO:  TTE:  No results found for this or any previous visit.      ASSESSMENT/PLAN:       Pre-op Assessment    I have reviewed the Patient Summary Reports.     I have reviewed the Nursing Notes.    I have reviewed the Medications.     Review of Systems  Anesthesia Hx:  No problems with previous Anesthesia             Denies Family Hx of Anesthesia complications.    Denies Personal Hx of Anesthesia complications.                    Social:  Non-Smoker       Hematology/Oncology:  Hematology Normal   Oncology Normal                                   EENT/Dental:  EENT/Dental Normal           Cardiovascular:  Cardiovascular Normal     Denies Hypertension.   Denies MI.         Denies CHF.                                 Pulmonary:  Pulmonary Normal   Denies COPD.      Denies Sleep Apnea.                Renal/:  Renal/ Normal                 Hepatic/GI:  Hepatic/GI Normal                 Musculoskeletal:  Musculoskeletal Normal                Neurological:  Neurology Normal   Denies CVA.                                    Endocrine:  Diabetes           Dermatological:  Skin Normal    Psych:  Psychiatric Normal                       Anesthesia Plan  Type of Anesthesia, risks & benefits discussed:    Anesthesia Type: Gen ETT, MAC, Regional, Gen Supraglottic Airway  Intra-op Monitoring Plan: Standard ASA Monitors  Post Op Pain Control Plan: multimodal analgesia and IV/PO Opioids PRN  Induction:  IV  Airway Plan: , Post-Induction  Informed Consent: Informed consent signed with the Patient and all parties understand the risks and agree with anesthesia plan.  All questions answered.   ASA Score: 2  Day of Surgery Review of History & Physical: H&P Update referred  to the surgeon/provider.    Ready For Surgery From Anesthesia Perspective.     .

## 2023-12-19 NOTE — TELEPHONE ENCOUNTER
Spoke with pt's daughter.  Advised that pt is to be NPO after midnight tonight.  Arrival time of 6 am tomorrow @ [facility].   Daughter verbalized understanding.

## 2023-12-20 ENCOUNTER — HOSPITAL ENCOUNTER (OUTPATIENT)
Facility: HOSPITAL | Age: 68
Discharge: HOME | End: 2023-12-22
Attending: ORTHOPAEDIC SURGERY | Admitting: ORTHOPAEDIC SURGERY
Payer: COMMERCIAL

## 2023-12-20 DIAGNOSIS — Z96.619 S/P REVERSE TOTAL SHOULDER ARTHROPLASTY: ICD-10-CM

## 2023-12-20 LAB
ESTIMATED AVG GLUCOSE: 157 MG/DL (ref 68–131)
HBA1C MFR BLD: 7.1 % (ref 4–5.6)
POCT GLUCOSE: 162 MG/DL (ref 70–110)
POCT GLUCOSE: 199 MG/DL (ref 70–110)
POCT GLUCOSE: 318 MG/DL (ref 70–110)

## 2023-12-20 PROCEDURE — C1713 ANCHOR/SCREW BN/BN,TIS/BN: HCPCS | Performed by: ORTHOPAEDIC SURGERY

## 2023-12-20 PROCEDURE — 25000003 PHARM REV CODE 250

## 2023-12-20 PROCEDURE — 82962 GLUCOSE BLOOD TEST: CPT | Performed by: ORTHOPAEDIC SURGERY

## 2023-12-20 PROCEDURE — 64416 NJX AA&/STRD BRCH PL NFS IMG: CPT | Performed by: STUDENT IN AN ORGANIZED HEALTH CARE EDUCATION/TRAINING PROGRAM

## 2023-12-20 PROCEDURE — 37000008 HC ANESTHESIA 1ST 15 MINUTES: Performed by: ORTHOPAEDIC SURGERY

## 2023-12-20 PROCEDURE — 64416 NJX AA&/STRD BRCH PL NFS IMG: CPT | Mod: 59,RT,, | Performed by: ANESTHESIOLOGY

## 2023-12-20 PROCEDURE — 94761 N-INVAS EAR/PLS OXIMETRY MLT: CPT

## 2023-12-20 PROCEDURE — 63600175 PHARM REV CODE 636 W HCPCS

## 2023-12-20 PROCEDURE — 71000016 HC POSTOP RECOV ADDL HR: Performed by: ORTHOPAEDIC SURGERY

## 2023-12-20 PROCEDURE — 36000710: Performed by: ORTHOPAEDIC SURGERY

## 2023-12-20 PROCEDURE — 36415 COLL VENOUS BLD VENIPUNCTURE: CPT

## 2023-12-20 PROCEDURE — 83036 HEMOGLOBIN GLYCOSYLATED A1C: CPT

## 2023-12-20 PROCEDURE — 71000033 HC RECOVERY, INTIAL HOUR: Performed by: ORTHOPAEDIC SURGERY

## 2023-12-20 PROCEDURE — 64416: ICD-10-PCS | Mod: 59,RT,, | Performed by: ANESTHESIOLOGY

## 2023-12-20 PROCEDURE — 96372 THER/PROPH/DIAG INJ SC/IM: CPT | Mod: 59

## 2023-12-20 PROCEDURE — 23472 RECONSTRUCT SHOULDER JOINT: CPT | Mod: RT,,, | Performed by: ORTHOPAEDIC SURGERY

## 2023-12-20 PROCEDURE — 25000003 PHARM REV CODE 250: Performed by: STUDENT IN AN ORGANIZED HEALTH CARE EDUCATION/TRAINING PROGRAM

## 2023-12-20 PROCEDURE — 63600175 PHARM REV CODE 636 W HCPCS: Performed by: ORTHOPAEDIC SURGERY

## 2023-12-20 PROCEDURE — 63600175 PHARM REV CODE 636 W HCPCS: Performed by: STUDENT IN AN ORGANIZED HEALTH CARE EDUCATION/TRAINING PROGRAM

## 2023-12-20 PROCEDURE — 63600175 PHARM REV CODE 636 W HCPCS: Performed by: ANESTHESIOLOGY

## 2023-12-20 PROCEDURE — 71000015 HC POSTOP RECOV 1ST HR: Performed by: ORTHOPAEDIC SURGERY

## 2023-12-20 PROCEDURE — C1889 IMPLANT/INSERT DEVICE, NOC: HCPCS | Performed by: ORTHOPAEDIC SURGERY

## 2023-12-20 PROCEDURE — 27201423 OPTIME MED/SURG SUP & DEVICES STERILE SUPPLY: Performed by: ORTHOPAEDIC SURGERY

## 2023-12-20 PROCEDURE — D9220A PRA ANESTHESIA: Mod: ,,, | Performed by: ANESTHESIOLOGY

## 2023-12-20 PROCEDURE — 37000009 HC ANESTHESIA EA ADD 15 MINS: Performed by: ORTHOPAEDIC SURGERY

## 2023-12-20 PROCEDURE — 23472 PR RECONSTR TOTAL SHOULDER IMPLANT: ICD-10-PCS | Mod: RT,,, | Performed by: ORTHOPAEDIC SURGERY

## 2023-12-20 PROCEDURE — D9220A PRA ANESTHESIA: ICD-10-PCS | Mod: ,,, | Performed by: ANESTHESIOLOGY

## 2023-12-20 PROCEDURE — 36000711: Performed by: ORTHOPAEDIC SURGERY

## 2023-12-20 PROCEDURE — C1776 JOINT DEVICE (IMPLANTABLE): HCPCS | Performed by: ORTHOPAEDIC SURGERY

## 2023-12-20 DEVICE — SCREW BONE TSA 4.5X28MM: Type: IMPLANTABLE DEVICE | Site: SHOULDER | Status: FUNCTIONAL

## 2023-12-20 DEVICE — IMPLANTABLE DEVICE: Type: IMPLANTABLE DEVICE | Site: SHOULDER | Status: FUNCTIONAL

## 2023-12-20 DEVICE — CUP HUMERAL REUN RSA 32X4MM: Type: IMPLANTABLE DEVICE | Site: SHOULDER | Status: FUNCTIONAL

## 2023-12-20 DEVICE — SCREW REUNION 6.5X24MM: Type: IMPLANTABLE DEVICE | Site: SHOULDER | Status: FUNCTIONAL

## 2023-12-20 DEVICE — SCREW PERIPH REUNION 4.5X16MM: Type: IMPLANTABLE DEVICE | Site: SHOULDER | Status: FUNCTIONAL

## 2023-12-20 DEVICE — BASEPLATE REUNION GLEN 28X10MM: Type: IMPLANTABLE DEVICE | Site: SHOULDER | Status: FUNCTIONAL

## 2023-12-20 DEVICE — COMPONENT GLENOSPHERE 32X6MM: Type: IMPLANTABLE DEVICE | Site: SHOULDER | Status: FUNCTIONAL

## 2023-12-20 DEVICE — SCREW BONE GLENOID 4.5X24MM: Type: IMPLANTABLE DEVICE | Site: SHOULDER | Status: FUNCTIONAL

## 2023-12-20 DEVICE — CEMENT BONE SURG SMPLX P RADPQ: Type: IMPLANTABLE DEVICE | Site: SHOULDER | Status: FUNCTIONAL

## 2023-12-20 DEVICE — INSERT HUMERAL STD 32X4MM: Type: IMPLANTABLE DEVICE | Site: SHOULDER | Status: FUNCTIONAL

## 2023-12-20 RX ORDER — SODIUM CHLORIDE 9 MG/ML
INJECTION, SOLUTION INTRAVENOUS CONTINUOUS
Status: DISCONTINUED | OUTPATIENT
Start: 2023-12-20 | End: 2023-12-20

## 2023-12-20 RX ORDER — LIDOCAINE HYDROCHLORIDE 20 MG/ML
INJECTION INTRAVENOUS
Status: DISCONTINUED | OUTPATIENT
Start: 2023-12-20 | End: 2023-12-20

## 2023-12-20 RX ORDER — AMOXICILLIN 250 MG
1 CAPSULE ORAL DAILY
Status: DISCONTINUED | OUTPATIENT
Start: 2023-12-20 | End: 2023-12-20

## 2023-12-20 RX ORDER — TRANEXAMIC ACID 100 MG/ML
INJECTION, SOLUTION INTRAVENOUS
Status: DISCONTINUED | OUTPATIENT
Start: 2023-12-20 | End: 2023-12-20

## 2023-12-20 RX ORDER — TOBRAMYCIN 1.2 G/30ML
INJECTION, POWDER, LYOPHILIZED, FOR SOLUTION INTRAVENOUS
Status: DISCONTINUED | OUTPATIENT
Start: 2023-12-20 | End: 2023-12-20 | Stop reason: HOSPADM

## 2023-12-20 RX ORDER — FENTANYL CITRATE 50 UG/ML
INJECTION, SOLUTION INTRAMUSCULAR; INTRAVENOUS
Status: DISCONTINUED | OUTPATIENT
Start: 2023-12-20 | End: 2023-12-20

## 2023-12-20 RX ORDER — ROCURONIUM BROMIDE 10 MG/ML
INJECTION, SOLUTION INTRAVENOUS
Status: DISCONTINUED | OUTPATIENT
Start: 2023-12-20 | End: 2023-12-20

## 2023-12-20 RX ORDER — OXYCODONE HYDROCHLORIDE 10 MG/1
10 TABLET ORAL EVERY 4 HOURS PRN
Status: DISCONTINUED | OUTPATIENT
Start: 2023-12-20 | End: 2023-12-21

## 2023-12-20 RX ORDER — OXYCODONE HYDROCHLORIDE 5 MG/1
5 TABLET ORAL
Status: DISCONTINUED | OUTPATIENT
Start: 2023-12-20 | End: 2023-12-20

## 2023-12-20 RX ORDER — HYDROMORPHONE HYDROCHLORIDE 1 MG/ML
0.2 INJECTION, SOLUTION INTRAMUSCULAR; INTRAVENOUS; SUBCUTANEOUS EVERY 5 MIN PRN
Status: DISCONTINUED | OUTPATIENT
Start: 2023-12-20 | End: 2023-12-20 | Stop reason: HOSPADM

## 2023-12-20 RX ORDER — FENTANYL CITRATE 50 UG/ML
25-200 INJECTION, SOLUTION INTRAMUSCULAR; INTRAVENOUS EVERY 5 MIN PRN
Status: DISPENSED | OUTPATIENT
Start: 2023-12-20

## 2023-12-20 RX ORDER — GLUCAGON 1 MG
1 KIT INJECTION
Status: DISCONTINUED | OUTPATIENT
Start: 2023-12-20 | End: 2023-12-22 | Stop reason: HOSPADM

## 2023-12-20 RX ORDER — CELECOXIB 200 MG/1
400 CAPSULE ORAL ONCE
Status: DISCONTINUED | OUTPATIENT
Start: 2023-12-20 | End: 2023-12-22 | Stop reason: HOSPADM

## 2023-12-20 RX ORDER — PROPOFOL 10 MG/ML
VIAL (ML) INTRAVENOUS
Status: DISCONTINUED | OUTPATIENT
Start: 2023-12-20 | End: 2023-12-20

## 2023-12-20 RX ORDER — METHOCARBAMOL 500 MG/1
500 TABLET, FILM COATED ORAL 4 TIMES DAILY
Status: DISCONTINUED | OUTPATIENT
Start: 2023-12-20 | End: 2023-12-21

## 2023-12-20 RX ORDER — POLYETHYLENE GLYCOL 3350 17 G/17G
17 POWDER, FOR SOLUTION ORAL DAILY PRN
Status: DISCONTINUED | OUTPATIENT
Start: 2023-12-20 | End: 2023-12-22 | Stop reason: HOSPADM

## 2023-12-20 RX ORDER — PHENYLEPHRINE HYDROCHLORIDE 10 MG/ML
INJECTION INTRAVENOUS
Status: DISCONTINUED | OUTPATIENT
Start: 2023-12-20 | End: 2023-12-20

## 2023-12-20 RX ORDER — SODIUM CHLORIDE 0.9 % (FLUSH) 0.9 %
10 SYRINGE (ML) INJECTION
Status: DISCONTINUED | OUTPATIENT
Start: 2023-12-20 | End: 2023-12-22 | Stop reason: HOSPADM

## 2023-12-20 RX ORDER — DEXAMETHASONE SODIUM PHOSPHATE 4 MG/ML
INJECTION, SOLUTION INTRA-ARTICULAR; INTRALESIONAL; INTRAMUSCULAR; INTRAVENOUS; SOFT TISSUE
Status: DISCONTINUED | OUTPATIENT
Start: 2023-12-20 | End: 2023-12-20

## 2023-12-20 RX ORDER — ACETAMINOPHEN 500 MG
1000 TABLET ORAL EVERY 6 HOURS SCHEDULED
Status: DISPENSED | OUTPATIENT
Start: 2023-12-20 | End: 2023-12-22

## 2023-12-20 RX ORDER — ONDANSETRON 2 MG/ML
INJECTION INTRAMUSCULAR; INTRAVENOUS
Status: DISCONTINUED | OUTPATIENT
Start: 2023-12-20 | End: 2023-12-20

## 2023-12-20 RX ORDER — AMOXICILLIN 250 MG
1 CAPSULE ORAL DAILY
Status: DISCONTINUED | OUTPATIENT
Start: 2023-12-20 | End: 2023-12-22 | Stop reason: HOSPADM

## 2023-12-20 RX ORDER — HALOPERIDOL 5 MG/ML
0.5 INJECTION INTRAMUSCULAR EVERY 10 MIN PRN
Status: DISCONTINUED | OUTPATIENT
Start: 2023-12-20 | End: 2023-12-20 | Stop reason: HOSPADM

## 2023-12-20 RX ORDER — INSULIN ASPART 100 [IU]/ML
0-5 INJECTION, SOLUTION INTRAVENOUS; SUBCUTANEOUS
Status: DISCONTINUED | OUTPATIENT
Start: 2023-12-20 | End: 2023-12-22 | Stop reason: HOSPADM

## 2023-12-20 RX ORDER — OXYCODONE HYDROCHLORIDE 5 MG/1
5 TABLET ORAL EVERY 4 HOURS PRN
Status: DISCONTINUED | OUTPATIENT
Start: 2023-12-20 | End: 2023-12-20

## 2023-12-20 RX ORDER — CALCIUM CARBONATE 200(500)MG
500 TABLET,CHEWABLE ORAL 3 TIMES DAILY PRN
Status: DISCONTINUED | OUTPATIENT
Start: 2023-12-20 | End: 2023-12-21

## 2023-12-20 RX ORDER — CEFAZOLIN SODIUM 1 G/3ML
INJECTION, POWDER, FOR SOLUTION INTRAMUSCULAR; INTRAVENOUS
Status: DISCONTINUED | OUTPATIENT
Start: 2023-12-20 | End: 2023-12-20

## 2023-12-20 RX ORDER — TALC
6 POWDER (GRAM) TOPICAL NIGHTLY PRN
Status: DISCONTINUED | OUTPATIENT
Start: 2023-12-20 | End: 2023-12-22 | Stop reason: HOSPADM

## 2023-12-20 RX ORDER — ROPIVACAINE HYDROCHLORIDE 5 MG/ML
INJECTION, SOLUTION EPIDURAL; INFILTRATION; PERINEURAL
Status: COMPLETED | OUTPATIENT
Start: 2023-12-20 | End: 2023-12-20

## 2023-12-20 RX ORDER — ACETAMINOPHEN 325 MG/1
650 TABLET ORAL EVERY 6 HOURS
Status: DISCONTINUED | OUTPATIENT
Start: 2023-12-20 | End: 2023-12-20

## 2023-12-20 RX ORDER — ONDANSETRON 4 MG/1
8 TABLET, FILM COATED ORAL EVERY 6 HOURS PRN
Status: DISCONTINUED | OUTPATIENT
Start: 2023-12-20 | End: 2023-12-22 | Stop reason: HOSPADM

## 2023-12-20 RX ORDER — ATORVASTATIN CALCIUM 20 MG/1
20 TABLET, FILM COATED ORAL DAILY
Status: DISCONTINUED | OUTPATIENT
Start: 2023-12-21 | End: 2023-12-22 | Stop reason: HOSPADM

## 2023-12-20 RX ORDER — TRAMADOL HYDROCHLORIDE 50 MG/1
50 TABLET ORAL EVERY 6 HOURS PRN
Status: DISCONTINUED | OUTPATIENT
Start: 2023-12-20 | End: 2023-12-21

## 2023-12-20 RX ORDER — MORPHINE SULFATE 2 MG/ML
2 INJECTION, SOLUTION INTRAMUSCULAR; INTRAVENOUS
Status: DISCONTINUED | OUTPATIENT
Start: 2023-12-20 | End: 2023-12-21

## 2023-12-20 RX ORDER — KETAMINE HCL IN 0.9 % NACL 50 MG/5 ML
SYRINGE (ML) INTRAVENOUS
Status: DISCONTINUED | OUTPATIENT
Start: 2023-12-20 | End: 2023-12-20

## 2023-12-20 RX ORDER — IBUPROFEN 200 MG
16 TABLET ORAL
Status: DISCONTINUED | OUTPATIENT
Start: 2023-12-20 | End: 2023-12-22 | Stop reason: HOSPADM

## 2023-12-20 RX ORDER — METHOCARBAMOL 500 MG/1
500 TABLET, FILM COATED ORAL EVERY 8 HOURS
Status: DISCONTINUED | OUTPATIENT
Start: 2023-12-20 | End: 2023-12-20

## 2023-12-20 RX ORDER — OXYCODONE HYDROCHLORIDE 10 MG/1
10 TABLET ORAL
Status: DISCONTINUED | OUTPATIENT
Start: 2023-12-20 | End: 2023-12-20

## 2023-12-20 RX ORDER — OXYCODONE HYDROCHLORIDE 5 MG/1
5 TABLET ORAL EVERY 4 HOURS PRN
Status: DISCONTINUED | OUTPATIENT
Start: 2023-12-20 | End: 2023-12-21

## 2023-12-20 RX ORDER — CELECOXIB 200 MG/1
200 CAPSULE ORAL DAILY
Status: DISCONTINUED | OUTPATIENT
Start: 2023-12-21 | End: 2023-12-22 | Stop reason: HOSPADM

## 2023-12-20 RX ORDER — ONDANSETRON 2 MG/ML
4 INJECTION INTRAMUSCULAR; INTRAVENOUS EVERY 12 HOURS PRN
Status: DISCONTINUED | OUTPATIENT
Start: 2023-12-20 | End: 2023-12-22 | Stop reason: HOSPADM

## 2023-12-20 RX ORDER — ASPIRIN 81 MG/1
81 TABLET ORAL 2 TIMES DAILY
Status: DISCONTINUED | OUTPATIENT
Start: 2023-12-20 | End: 2023-12-22 | Stop reason: HOSPADM

## 2023-12-20 RX ORDER — VASOPRESSIN 20 [USP'U]/ML
INJECTION, SOLUTION INTRAMUSCULAR; SUBCUTANEOUS
Status: DISCONTINUED | OUTPATIENT
Start: 2023-12-20 | End: 2023-12-20

## 2023-12-20 RX ORDER — ROPIVACAINE HYDROCHLORIDE 2 MG/ML
0.1 INJECTION, SOLUTION EPIDURAL; INFILTRATION; PERINEURAL CONTINUOUS
Status: DISCONTINUED | OUTPATIENT
Start: 2023-12-20 | End: 2023-12-22 | Stop reason: HOSPADM

## 2023-12-20 RX ORDER — ACETAMINOPHEN 10 MG/ML
INJECTION, SOLUTION INTRAVENOUS
Status: DISCONTINUED | OUTPATIENT
Start: 2023-12-20 | End: 2023-12-20

## 2023-12-20 RX ORDER — POLYETHYLENE GLYCOL 3350 17 G/17G
17 POWDER, FOR SOLUTION ORAL DAILY
Status: DISCONTINUED | OUTPATIENT
Start: 2023-12-20 | End: 2023-12-22 | Stop reason: HOSPADM

## 2023-12-20 RX ORDER — PREGABALIN 75 MG/1
75 CAPSULE ORAL NIGHTLY
Status: DISCONTINUED | OUTPATIENT
Start: 2023-12-20 | End: 2023-12-22 | Stop reason: HOSPADM

## 2023-12-20 RX ORDER — MUPIROCIN 20 MG/G
OINTMENT TOPICAL
Status: DISCONTINUED | OUTPATIENT
Start: 2023-12-20 | End: 2023-12-20 | Stop reason: HOSPADM

## 2023-12-20 RX ORDER — MIDAZOLAM HYDROCHLORIDE 1 MG/ML
.5-4 INJECTION INTRAMUSCULAR; INTRAVENOUS
Status: DISPENSED | OUTPATIENT
Start: 2023-12-20

## 2023-12-20 RX ORDER — ACETAMINOPHEN 500 MG
1000 TABLET ORAL
Status: COMPLETED | OUTPATIENT
Start: 2023-12-20 | End: 2023-12-20

## 2023-12-20 RX ORDER — DOCUSATE SODIUM 100 MG/1
100 CAPSULE, LIQUID FILLED ORAL 2 TIMES DAILY
Status: DISCONTINUED | OUTPATIENT
Start: 2023-12-20 | End: 2023-12-22 | Stop reason: HOSPADM

## 2023-12-20 RX ORDER — VANCOMYCIN HYDROCHLORIDE 1 G/20ML
INJECTION, POWDER, LYOPHILIZED, FOR SOLUTION INTRAVENOUS
Status: DISCONTINUED | OUTPATIENT
Start: 2023-12-20 | End: 2023-12-20 | Stop reason: HOSPADM

## 2023-12-20 RX ORDER — IBUPROFEN 200 MG
24 TABLET ORAL
Status: DISCONTINUED | OUTPATIENT
Start: 2023-12-20 | End: 2023-12-22 | Stop reason: HOSPADM

## 2023-12-20 RX ADMIN — PHENYLEPHRINE HYDROCHLORIDE 50 MCG: 10 INJECTION INTRAVENOUS at 12:12

## 2023-12-20 RX ADMIN — DOCUSATE SODIUM 100 MG: 100 CAPSULE, LIQUID FILLED ORAL at 09:12

## 2023-12-20 RX ADMIN — SODIUM CHLORIDE, SODIUM GLUCONATE, SODIUM ACETATE, POTASSIUM CHLORIDE, MAGNESIUM CHLORIDE, SODIUM PHOSPHATE, DIBASIC, AND POTASSIUM PHOSPHATE: .53; .5; .37; .037; .03; .012; .00082 INJECTION, SOLUTION INTRAVENOUS at 10:12

## 2023-12-20 RX ADMIN — SODIUM CHLORIDE 0.2 MCG/KG/MIN: 9 INJECTION, SOLUTION INTRAVENOUS at 08:12

## 2023-12-20 RX ADMIN — PHENYLEPHRINE HYDROCHLORIDE 100 MCG: 10 INJECTION INTRAVENOUS at 11:12

## 2023-12-20 RX ADMIN — ROCURONIUM BROMIDE 15 MG: 10 INJECTION, SOLUTION INTRAVENOUS at 10:12

## 2023-12-20 RX ADMIN — CEFAZOLIN 2 G: 330 INJECTION, POWDER, FOR SOLUTION INTRAMUSCULAR; INTRAVENOUS at 08:12

## 2023-12-20 RX ADMIN — PHENYLEPHRINE HYDROCHLORIDE 100 MCG: 10 INJECTION INTRAVENOUS at 12:12

## 2023-12-20 RX ADMIN — ROCURONIUM BROMIDE 15 MG: 10 INJECTION, SOLUTION INTRAVENOUS at 11:12

## 2023-12-20 RX ADMIN — FENTANYL CITRATE 50 MCG: 50 INJECTION INTRAMUSCULAR; INTRAVENOUS at 07:12

## 2023-12-20 RX ADMIN — SODIUM CHLORIDE: 9 INJECTION, SOLUTION INTRAVENOUS at 07:12

## 2023-12-20 RX ADMIN — MUPIROCIN: 20 OINTMENT TOPICAL at 07:12

## 2023-12-20 RX ADMIN — ROCURONIUM BROMIDE 50 MG: 10 INJECTION, SOLUTION INTRAVENOUS at 08:12

## 2023-12-20 RX ADMIN — PROPOFOL 130 MG: 10 INJECTION, EMULSION INTRAVENOUS at 08:12

## 2023-12-20 RX ADMIN — OXYCODONE HYDROCHLORIDE 5 MG: 5 TABLET ORAL at 03:12

## 2023-12-20 RX ADMIN — PHENYLEPHRINE HYDROCHLORIDE 100 MCG: 10 INJECTION INTRAVENOUS at 09:12

## 2023-12-20 RX ADMIN — VANCOMYCIN HYDROCHLORIDE 1000 MG: 1 INJECTION, POWDER, LYOPHILIZED, FOR SOLUTION INTRAVENOUS at 07:12

## 2023-12-20 RX ADMIN — ROCURONIUM BROMIDE 10 MG: 10 INJECTION, SOLUTION INTRAVENOUS at 12:12

## 2023-12-20 RX ADMIN — LIDOCAINE HYDROCHLORIDE 80 MG: 20 INJECTION INTRAVENOUS at 08:12

## 2023-12-20 RX ADMIN — Medication 10 MG: at 11:12

## 2023-12-20 RX ADMIN — INSULIN DETEMIR 5 UNITS: 100 INJECTION, SOLUTION SUBCUTANEOUS at 09:12

## 2023-12-20 RX ADMIN — PHENYLEPHRINE HYDROCHLORIDE 200 MCG: 10 INJECTION INTRAVENOUS at 09:12

## 2023-12-20 RX ADMIN — METHOCARBAMOL 500 MG: 500 TABLET ORAL at 02:12

## 2023-12-20 RX ADMIN — ASPIRIN 81 MG: 81 TABLET, COATED ORAL at 09:12

## 2023-12-20 RX ADMIN — FENTANYL CITRATE 50 MCG: 50 INJECTION, SOLUTION INTRAMUSCULAR; INTRAVENOUS at 12:12

## 2023-12-20 RX ADMIN — TRANEXAMIC ACID 1000 MG: 100 INJECTION INTRAVENOUS at 08:12

## 2023-12-20 RX ADMIN — SODIUM CHLORIDE: 0.9 INJECTION, SOLUTION INTRAVENOUS at 08:12

## 2023-12-20 RX ADMIN — DEXAMETHASONE SODIUM PHOSPHATE 8 MG: 4 INJECTION, SOLUTION INTRAMUSCULAR; INTRAVENOUS at 08:12

## 2023-12-20 RX ADMIN — SENNOSIDES AND DOCUSATE SODIUM 1 TABLET: 8.6; 5 TABLET ORAL at 04:12

## 2023-12-20 RX ADMIN — ACETAMINOPHEN 1000 MG: 10 INJECTION, SOLUTION INTRAVENOUS at 01:12

## 2023-12-20 RX ADMIN — INSULIN ASPART 2 UNITS: 100 INJECTION, SOLUTION INTRAVENOUS; SUBCUTANEOUS at 09:12

## 2023-12-20 RX ADMIN — MIDAZOLAM 2 MG: 1 INJECTION INTRAMUSCULAR; INTRAVENOUS at 07:12

## 2023-12-20 RX ADMIN — Medication 20 MG: at 10:12

## 2023-12-20 RX ADMIN — PHENYLEPHRINE HYDROCHLORIDE 100 MCG: 10 INJECTION INTRAVENOUS at 08:12

## 2023-12-20 RX ADMIN — ONDANSETRON 4 MG: 2 INJECTION INTRAMUSCULAR; INTRAVENOUS at 01:12

## 2023-12-20 RX ADMIN — ACETAMINOPHEN 1000 MG: 325 TABLET ORAL at 11:12

## 2023-12-20 RX ADMIN — ACETAMINOPHEN 1000 MG: 325 TABLET ORAL at 06:12

## 2023-12-20 RX ADMIN — ROPIVACAINE HYDROCHLORIDE 0.1 ML/HR: 2 INJECTION, SOLUTION EPIDURAL; INFILTRATION at 03:12

## 2023-12-20 RX ADMIN — TRANEXAMIC ACID 1000 MG: 100 INJECTION INTRAVENOUS at 12:12

## 2023-12-20 RX ADMIN — ACETAMINOPHEN 1000 MG: 500 TABLET ORAL at 07:12

## 2023-12-20 RX ADMIN — PREGABALIN 75 MG: 75 CAPSULE ORAL at 09:12

## 2023-12-20 RX ADMIN — VASOPRESSIN 1 UNITS: 20 INJECTION INTRAVENOUS at 12:12

## 2023-12-20 RX ADMIN — ROCURONIUM BROMIDE 20 MG: 10 INJECTION, SOLUTION INTRAVENOUS at 09:12

## 2023-12-20 RX ADMIN — METHOCARBAMOL 500 MG: 500 TABLET ORAL at 09:12

## 2023-12-20 RX ADMIN — ROPIVACAINE HYDROCHLORIDE 10 ML: 5 INJECTION EPIDURAL; INFILTRATION; PERINEURAL at 07:12

## 2023-12-20 RX ADMIN — CEFAZOLIN 1 G: 330 INJECTION, POWDER, FOR SOLUTION INTRAMUSCULAR; INTRAVENOUS at 12:12

## 2023-12-20 RX ADMIN — CEFAZOLIN 2 G: 2 INJECTION, POWDER, FOR SOLUTION INTRAMUSCULAR; INTRAVENOUS at 04:12

## 2023-12-20 RX ADMIN — CEFAZOLIN 2 G: 2 INJECTION, POWDER, FOR SOLUTION INTRAMUSCULAR; INTRAVENOUS at 11:12

## 2023-12-20 RX ADMIN — HYDROMORPHONE HYDROCHLORIDE 0.2 MG: 1 INJECTION, SOLUTION INTRAMUSCULAR; INTRAVENOUS; SUBCUTANEOUS at 02:12

## 2023-12-20 RX ADMIN — FENTANYL CITRATE 100 MCG: 50 INJECTION, SOLUTION INTRAMUSCULAR; INTRAVENOUS at 08:12

## 2023-12-20 NOTE — HPI
Bianka Bernard is a 68 year old woman with type 2 diabetes who initially presented to the ER on 12/13 and was found to have a right shoulder fracture. She presents to [facility] on 12/20 for right shoulder arthroplasty and rotator cuff repair with Dr Witt.     Medicine consults team was consulted for diabetes management. Patient reports she has no other comorbidities.   Last A1c from 5 years ago was 8.3%. No other labs to review in care everywhere.  Patient has a PCP outside of [facility] and reports that her most recent A1c was 6.8%. Patient takes metformin 1000mg BID, Jardiance 10mg, and Rybelsus 3mg at home. She does not check BG at home due to fear of needles.   Morning of the procedure patient had a fasting BG of 166. She received 8mg of dexamethasone prior to surgery at 8am. Postop BG was 179 per nursing staff (this did not cross over to Epic). At time of interview patient had complaints of hunger, and had well controlled shoulder pain.

## 2023-12-20 NOTE — ANESTHESIA PROCEDURE NOTES
R Interscale Catheter    Patient location during procedure: pre-op   Block not for primary anesthetic.  Reason for block: at surgeon's request and post-op pain management   Post-op Pain Location: R Shoulder Pain   Start time: 12/20/2023 7:51 AM  Timeout: 12/20/2023 7:50 AM   End time: 12/20/2023 8:05 AM    Staffing  Authorizing Provider: Roberto Carlos Nichols MD  Performing Provider: Azar Abdi MD    Staffing  Performed by: Azar Abdi MD  Authorized by: Roberto Carlos Nichols MD    Preanesthetic Checklist  Completed: patient identified, IV checked, site marked, risks and benefits discussed, surgical consent, monitors and equipment checked, pre-op evaluation and timeout performed  Peripheral Block  Patient position: sitting  Prep: ChloraPrep and site prepped and draped  Patient monitoring: heart rate, cardiac monitor, continuous pulse ox, continuous capnometry and frequent blood pressure checks  Block type: interscalene  Laterality: right  Injection technique: continuous  Needle  Needle type: Tuohy   Needle gauge: 18 G  Needle length: 3.5 in  Needle localization: anatomical landmarks and ultrasound guidance  Catheter type: non-stimulating  Catheter size: 20 G  Test dose: lidocaine 1.5% with Epi 1-to-200,000 and negative   -ultrasound image captured on disc.  Assessment  Injection assessment: negative aspiration, negative parasthesia and local visualized surrounding nerve  Paresthesia pain: none  Heart rate change: no  Slow fractionated injection: yes  Pain Tolerance: comfortable throughout block and no complaints  Medications:    Medications: ropivacaine (NAROPIN) injection 0.5% - Perineural   10 mL - 12/20/2023 7:55:00 AM    Additional Notes  VSS.  [facility] RN monitoring vitals throughout procedure.  Patient tolerated procedure well.

## 2023-12-20 NOTE — PROGRESS NOTES
MD at bedside to consent pt for surgical procedure. Will procede with regional block once complete

## 2023-12-20 NOTE — BRIEF OP NOTE
[facility] - Surgery ([facility])  Brief Operative Note    Surgery Date: 12/20/2023     Surgeon(s) and Role:     * Jesse Witt MD - Primary     * Presley Lawson MD - Resident - Assisting        Pre-op Diagnosis:  Other closed displaced fracture of proximal end of right humerus, initial encounter [S42.291A]    Post-op Diagnosis:  Post-Op Diagnosis Codes:     * Other closed displaced fracture of proximal end of right humerus, initial encounter [S42.291A]    Procedure(s) (LRB):  ARTHROPLASTY, SHOULDER (Right)  REPAIR, ROTATOR CUFF (Right)    Anesthesia: General    Operative Findings: See op note.    Estimated Blood Loss: * No values recorded between 12/20/2023  9:17 AM and 12/20/2023  2:13 PM *         Specimens:   Specimen (24h ago, onward)      None              Discharge Note    OUTCOME: Patient tolerated treatment/procedure well without complication and is now ready for discharge.    DISPOSITION: Home or Self Care    FINAL DIAGNOSIS:  Closed fracture of proximal end of right humerus    FOLLOWUP: In clinic    DISCHARGE INSTRUCTIONS:  No discharge procedures on file.

## 2023-12-20 NOTE — INTERVAL H&P NOTE
The patient has been examined and the H&P has been reviewed:    I concur with the findings and no changes have occurred since H&P was written.    Surgery risks, benefits and alternative options discussed and understood by patient/family.          Active Hospital Problems    Diagnosis  POA    *Closed fracture of proximal end of right humerus [S42.201A]  Yes      Resolved Hospital Problems   No resolved problems to display.
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
## 2023-12-20 NOTE — ASSESSMENT & PLAN NOTE
Hospital medicine consulted for diabetes management postoperatively. Morning of the procedure patient had a fasting BG of 166. She received 8mg of dexamethasone prior to surgery at 8am. Postop BG was 179 per nursing staff (this did not cross over to Epic).  Home regimen: metformin 1000mg BID, Jardiance 10mg, and Rybelsus 3mg. She does not check BG at home due to fear of needles.     Patient's FSGs are controlled on current medication regimen.  Last A1c reviewed-   Lab Results   Component Value Date    HGBA1C 8.3 (H) 07/11/2018     Most recent fingerstick glucose reviewed-   Recent Labs   Lab 12/20/23  0641   POCTGLUCOSE 162*     Current correctional scale  Low  Maintain anti-hyperglycemic dose as follows-   Antihyperglycemics (From admission, onward)      Start     Stop Route Frequency Ordered    12/20/23 1649  insulin aspart U-100 pen 0-5 Units         -- SubQ Before meals & nightly PRN 12/20/23 1549          Recommendations   - Repeat A1c now  - Glucose checks and LDSSI ACHS  - If next few BG readings are elevated, will start 8U detemir at night  - Hypoglycemic protocol in place  - Diabetic diet  - Hold Oral hypoglycemics while patient is in the hospital, resume on discharge and f/u with PCP

## 2023-12-20 NOTE — SUBJECTIVE & OBJECTIVE
Past Medical History:   Diagnosis Date    Cataract     Diabetes mellitus, type 2     Hematuria     Long hx of this.  Being followed by Nephrologist, Dr. Burgos at [facility].       Past Surgical History:   Procedure Laterality Date    BREAST BIOPSY Left     core    BREAST CYST ASPIRATION Bilateral     STAPEDECTOMY Right 1/29/2019    Procedure: STAPEDECTOMY;  Surgeon: Luis Solorio MD;  Location: [facility] OR [address] [place] [address];  Service: ENT;  Laterality: Right;       Review of patient's allergies indicates:   Allergen Reactions    Sulfa (sulfonamide antibiotics) Hives       No current facility-administered medications on file prior to encounter.     Current Outpatient Medications on File Prior to Encounter   Medication Sig    atorvastatin (LIPITOR) 20 MG tablet Take 20 mg by mouth once daily.    cholecalciferol, vitamin D3, (VITAMIN D3) 1,000 unit capsule Take 1,000 Units by mouth every morning.    empagliflozin (JARDIANCE) 10 mg tablet Take 10 mg by mouth every evening.    metFORMIN (GLUCOPHAGE) 1000 MG tablet Take 1 tablet (1,000 mg total) by mouth 2 (two) times daily with meals.    RYBELSUS 3 mg tablet Take 3 mg by mouth every morning.    MAGNESIUM ORAL Take by mouth every morning.    meclizine (ANTIVERT) 12.5 mg tablet Take 1 tablet (12.5 mg total) by mouth 3 (three) times daily as needed for Dizziness. (Patient not taking: Reported on 12/13/2023)    meclizine (ANTIVERT) 25 mg tablet     neomycin-polymyxin-hydrocortisone (CORTISPORIN) 3.5-10,000-1 mg/mL-unit/mL-% otic suspension Place 3 drops into the right ear 3 (three) times daily. (Patient not taking: Reported on 12/13/2023)     Family History       Problem Relation (Age of Onset)    Cataracts Mother    Deep vein thrombosis Father    Hypertension Mother, Father, Sister          Tobacco Use    Smoking status: Never    Smokeless tobacco: Never   Substance and Sexual Activity    Alcohol use: No    Drug use: No    Sexual activity: Yes     Partners: Male     Review of Systems    Constitutional: Negative.    Respiratory: Negative.     Cardiovascular: Negative.    Gastrointestinal: Negative.    Musculoskeletal:  Positive for arthralgias (shoulder pain postop well controlled).   Neurological: Negative.    Psychiatric/Behavioral: Negative.     All other systems reviewed and are negative.    Objective:     Vital Signs (Most Recent):  Temp: 97.5 °F (36.4 °C) (12/20/23 1530)  Pulse: 74 (12/20/23 1530)  Resp: (!) 22 (12/20/23 1530)  BP: 134/61 (12/20/23 1530)  SpO2: 96 % (12/20/23 1530) Vital Signs (24h Range):  Temp:  [97.5 °F (36.4 °C)-98.1 °F (36.7 °C)] 97.5 °F (36.4 °C)  Pulse:  [66-88] 74  Resp:  [12-22] 22  SpO2:  [95 %-100 %] 96 %  BP: ()/(46-77) 134/61     Weight: 51.3 kg (113 lb)  Body mass index is 20.02 kg/m².     Physical Exam  Vitals reviewed.   Constitutional:       General: She is not in acute distress.     Appearance: She is not ill-appearing.   HENT:      Mouth/Throat:      Mouth: Mucous membranes are dry.   Cardiovascular:      Rate and Rhythm: Normal rate and regular rhythm.      Pulses: Normal pulses.      Heart sounds: Normal heart sounds.   Pulmonary:      Effort: Pulmonary effort is normal.      Breath sounds: Normal breath sounds.   Abdominal:      Palpations: Abdomen is soft.      Tenderness: There is no abdominal tenderness.   Musculoskeletal:      Right lower leg: No edema.      Left lower leg: No edema.      Comments: R shoulder in immobilizing sling   Skin:     General: Skin is warm and dry.      Capillary Refill: Capillary refill takes less than 2 seconds.   Neurological:      Mental Status: She is alert and oriented to person, place, and time. Mental status is at baseline.          Significant Labs: All pertinent labs within the past 24 hours have been reviewed.    Significant Imaging: I have reviewed all pertinent imaging results/findings within the past 24 hours.

## 2023-12-20 NOTE — CONSULTS
[facility] - [facility] ([facility])  [facility] Medicine  Consult Note    Patient Name: Bianka Bernard  MRN: [id number]  Admission Date: 12/20/2023  Hospital Length of Stay: 0 days  Attending Physician: Jesse Witt*   Primary Care Provider: Pantera Burgos MD           Patient information was obtained from patient and ER records.     Inpatient consult to Hospital Medicine-General  Consult performed by: Socorro Farfan MD  Consult ordered by: Presley Lawson MD        Subjective:     Principal Problem: Closed fracture of proximal end of right humerus    Chief Complaint: No chief complaint on file.       HPI: Bianka Bernard is a 68 year old woman with type 2 diabetes who initially presented to the ER on 12/13 and was found to have a right shoulder fracture. She presents to [facility] on 12/20 for right shoulder arthroplasty and rotator cuff repair with Dr Witt.     Medicine consults team was consulted for diabetes management. Patient reports she has no other comorbidities.   Last A1c from 5 years ago was 8.3%. No other labs to review in care everywhere.  Patient has a PCP outside of [facility] and reports that her most recent A1c was 6.8%. Patient takes metformin 1000mg BID, Jardiance 10mg, and Rybelsus 3mg at home. She does not check BG at home due to fear of needles.   Morning of the procedure patient had a fasting BG of 166. She received 8mg of dexamethasone prior to surgery at 8am. Postop BG was 179 per nursing staff (this did not cross over to Epic). At time of interview patient had complaints of hunger, and had well controlled shoulder pain.       Past Medical History:   Diagnosis Date    Cataract     Diabetes mellitus, type 2     Hematuria     Long hx of this.  Being followed by Nephrologist, Dr. Burgos at [facility].       Past Surgical History:   Procedure Laterality Date    BREAST BIOPSY Left     core    BREAST CYST ASPIRATION Bilateral     STAPEDECTOMY Right 1/29/2019    Procedure: STAPEDECTOMY;  Surgeon: Luis JONES  MD Osmin;  Location: [facility] OR [address] [place] [address];  Service: ENT;  Laterality: Right;       Review of patient's allergies indicates:   Allergen Reactions    Sulfa (sulfonamide antibiotics) Hives       No current facility-administered medications on file prior to encounter.     Current Outpatient Medications on File Prior to Encounter   Medication Sig    atorvastatin (LIPITOR) 20 MG tablet Take 20 mg by mouth once daily.    cholecalciferol, vitamin D3, (VITAMIN D3) 1,000 unit capsule Take 1,000 Units by mouth every morning.    empagliflozin (JARDIANCE) 10 mg tablet Take 10 mg by mouth every evening.    metFORMIN (GLUCOPHAGE) 1000 MG tablet Take 1 tablet (1,000 mg total) by mouth 2 (two) times daily with meals.    RYBELSUS 3 mg tablet Take 3 mg by mouth every morning.    MAGNESIUM ORAL Take by mouth every morning.    meclizine (ANTIVERT) 12.5 mg tablet Take 1 tablet (12.5 mg total) by mouth 3 (three) times daily as needed for Dizziness. (Patient not taking: Reported on 12/13/2023)    meclizine (ANTIVERT) 25 mg tablet     neomycin-polymyxin-hydrocortisone (CORTISPORIN) 3.5-10,000-1 mg/mL-unit/mL-% otic suspension Place 3 drops into the right ear 3 (three) times daily. (Patient not taking: Reported on 12/13/2023)     Family History       Problem Relation (Age of Onset)    Cataracts Mother    Deep vein thrombosis Father    Hypertension Mother, Father, Sister          Tobacco Use    Smoking status: Never    Smokeless tobacco: Never   Substance and Sexual Activity    Alcohol use: No    Drug use: No    Sexual activity: Yes     Partners: Male     Review of Systems   Constitutional: Negative.    Respiratory: Negative.     Cardiovascular: Negative.    Gastrointestinal: Negative.    Musculoskeletal:  Positive for arthralgias (shoulder pain postop well controlled).   Neurological: Negative.    Psychiatric/Behavioral: Negative.     All other systems reviewed and are negative.    Objective:     Vital Signs (Most Recent):  Temp: 97.5 °F (36.4  °C) (12/20/23 1530)  Pulse: 74 (12/20/23 1530)  Resp: (!) 22 (12/20/23 1530)  BP: 134/61 (12/20/23 1530)  SpO2: 96 % (12/20/23 1530) Vital Signs (24h Range):  Temp:  [97.5 °F (36.4 °C)-98.1 °F (36.7 °C)] 97.5 °F (36.4 °C)  Pulse:  [66-88] 74  Resp:  [12-22] 22  SpO2:  [95 %-100 %] 96 %  BP: ()/(46-77) 134/61     Weight: 51.3 kg (113 lb)  Body mass index is 20.02 kg/m².     Physical Exam  Vitals reviewed.   Constitutional:       General: She is not in acute distress.     Appearance: She is not ill-appearing.   HENT:      Mouth/Throat:      Mouth: Mucous membranes are dry.   Cardiovascular:      Rate and Rhythm: Normal rate and regular rhythm.      Pulses: Normal pulses.      Heart sounds: Normal heart sounds.   Pulmonary:      Effort: Pulmonary effort is normal.      Breath sounds: Normal breath sounds.   Abdominal:      Palpations: Abdomen is soft.      Tenderness: There is no abdominal tenderness.   Musculoskeletal:      Right lower leg: No edema.      Left lower leg: No edema.      Comments: R shoulder in immobilizing sling   Skin:     General: Skin is warm and dry.      Capillary Refill: Capillary refill takes less than 2 seconds.   Neurological:      Mental Status: She is alert and oriented to person, place, and time. Mental status is at baseline.          Significant Labs: All pertinent labs within the past 24 hours have been reviewed.    Significant Imaging: I have reviewed all pertinent imaging results/findings within the past 24 hours.  Assessment/Plan:     Type 2 diabetes mellitus without complication, without long-term current use of insulin  Hospital medicine consulted for diabetes management postoperatively. Morning of the procedure patient had a fasting BG of 166. She received 8mg of dexamethasone prior to surgery at 8am. Postop BG was 179 per nursing staff (this did not cross over to Epic).  Home regimen: metformin 1000mg BID, Jardiance 10mg, and Rybelsus 3mg. She does not check BG at home due to  fear of needles.     Patient's FSGs are controlled on current medication regimen.  Last A1c reviewed-   Lab Results   Component Value Date    HGBA1C 8.3 (H) 07/11/2018     Most recent fingerstick glucose reviewed-   Recent Labs   Lab 12/20/23  0641   POCTGLUCOSE 162*       Current correctional scale  Low  Maintain anti-hyperglycemic dose as follows-   Antihyperglycemics (From admission, onward)      Start     Stop Route Frequency Ordered    12/20/23 1649  insulin aspart U-100 pen 0-5 Units         -- SubQ Before meals & nightly PRN 12/20/23 1549          Recommendations   - Repeat A1c now  - Glucose checks and LDSSI ACHS  - Inpatient BG goal 140-180  - If next few BG readings are elevated >180, will start 5U detemir at night  - Hypoglycemic protocol in place  - Diabetic diet  - Hold Oral hypoglycemics while patient is in the hospital, resume on discharge and f/u with PCP      VTE Risk Mitigation (From admission, onward)           Ordered     Place sequential compression device  Until discontinued         12/20/23 1517     Place sequential compression device  Until discontinued         12/20/23 1456     Place sequential compression device  Until discontinued         12/20/23 0630     IP VTE HIGH RISK PATIENT  Once         12/20/23 0630                        Thank you for your consult. I will follow-up with patient. Please contact us if you have any additional questions.    Socorro Farfan MD  Department of Hospital Medicine   [facility] - Surgery ([facility])

## 2023-12-20 NOTE — ASSESSMENT & PLAN NOTE
Hospital medicine consulted for diabetes management postoperatively. Morning of the procedure patient had a fasting BG of 166. She received 8mg of dexamethasone prior to surgery at 8am. Postop BG was 179 per nursing staff (this did not cross over to Epic).  Home regimen: metformin 1000mg BID, Jardiance 10mg, and Rybelsus 3mg. She does not check BG at home due to fear of needles.     Patient's FSGs are controlled on current medication regimen.  Last A1c reviewed-   Lab Results   Component Value Date    HGBA1C 7.1 (H) 12/20/2023     Most recent fingerstick glucose reviewed-   Recent Labs   Lab 12/20/23  0641   POCTGLUCOSE 162*       Current correctional scale  Low  Maintain anti-hyperglycemic dose as follows-   Antihyperglycemics (From admission, onward)      Start     Stop Route Frequency Ordered    12/20/23 1649  insulin aspart U-100 pen 0-5 Units         -- SubQ Before meals & nightly PRN 12/20/23 1549          Recommendations:  - Glucose checks and LDSSI ACHS  - Inpatient BG goal 140-180  - If next few BG readings are elevated >180, will start 5U detemir at night  - Hypoglycemic protocol in place  - Diabetic diet  - Hold Oral hypoglycemics while patient is in the hospital, resume on discharge and f/u with PCP

## 2023-12-20 NOTE — ANESTHESIA PROCEDURE NOTES
Intubation    Date/Time: 12/20/2023 8:24 AM    Performed by: Lobo Linares DO  Authorized by: Roberto Carlos Nichols MD    Intubation:     Induction:  Intravenous    Intubated:  Postinduction    Mask Ventilation:  Easy mask    Attempts:  1    Attempted By:  Resident anesthesiologist    Method of Intubation:  Direct    Blade:  Jaswant 3    Laryngeal View Grade: Grade I - full view of cords      Difficult Airway Encountered?: No      Complications:  None    Airway Device:  Oral endotracheal tube    Airway Device Size:  7.0    Style/Cuff Inflation:  Cuffed    Tube secured:  22    Secured at:  The lips    Placement Verified By:  Capnometry    Complicating Factors:  Anterior larynx    Findings Post-Intubation:  BS equal bilateral and atraumatic/condition of teeth unchanged

## 2023-12-20 NOTE — PLAN OF CARE
Pt ambulated to block area with spouse a/a/o x4. C/O mild pain to affected shoulder but tolerable. Pt VS obtained and stable, in NAD. Will continue to monitor

## 2023-12-21 PROBLEM — E55.9 VITAMIN D INSUFFICIENCY: Status: ACTIVE | Noted: 2023-12-21

## 2023-12-21 PROBLEM — D64.9 NORMOCYTIC ANEMIA: Status: ACTIVE | Noted: 2023-12-21

## 2023-12-21 LAB
25(OH)D3+25(OH)D2 SERPL-MCNC: 24 NG/ML (ref 30–96)
ALBUMIN SERPL BCP-MCNC: 3 G/DL (ref 3.5–5.2)
ALP SERPL-CCNC: 74 U/L (ref 55–135)
ALT SERPL W/O P-5'-P-CCNC: 32 U/L (ref 10–44)
ANION GAP SERPL CALC-SCNC: 6 MMOL/L (ref 8–16)
AST SERPL-CCNC: 34 U/L (ref 10–40)
BASOPHILS # BLD AUTO: 0.03 K/UL (ref 0–0.2)
BASOPHILS NFR BLD: 0.4 % (ref 0–1.9)
BILIRUB SERPL-MCNC: 0.7 MG/DL (ref 0.1–1)
BUN SERPL-MCNC: 12 MG/DL (ref 8–23)
CALCIUM SERPL-MCNC: 8.9 MG/DL (ref 8.7–10.5)
CHLORIDE SERPL-SCNC: 107 MMOL/L (ref 95–110)
CO2 SERPL-SCNC: 26 MMOL/L (ref 23–29)
CREAT SERPL-MCNC: 0.7 MG/DL (ref 0.5–1.4)
DIFFERENTIAL METHOD: ABNORMAL
EOSINOPHIL # BLD AUTO: 0 K/UL (ref 0–0.5)
EOSINOPHIL NFR BLD: 0.6 % (ref 0–8)
ERYTHROCYTE [DISTWIDTH] IN BLOOD BY AUTOMATED COUNT: 14.7 % (ref 11.5–14.5)
EST. GFR  (NO RACE VARIABLE): >60 ML/MIN/1.73 M^2
GLUCOSE SERPL-MCNC: 157 MG/DL (ref 70–110)
HCT VFR BLD AUTO: 32.1 % (ref 37–48.5)
HGB BLD-MCNC: 10.1 G/DL (ref 12–16)
IMM GRANULOCYTES # BLD AUTO: 0.02 K/UL (ref 0–0.04)
IMM GRANULOCYTES NFR BLD AUTO: 0.3 % (ref 0–0.5)
LYMPHOCYTES # BLD AUTO: 1.4 K/UL (ref 1–4.8)
LYMPHOCYTES NFR BLD: 20.5 % (ref 18–48)
MCH RBC QN AUTO: 28.1 PG (ref 27–31)
MCHC RBC AUTO-ENTMCNC: 31.5 G/DL (ref 32–36)
MCV RBC AUTO: 89 FL (ref 82–98)
MONOCYTES # BLD AUTO: 0.6 K/UL (ref 0.3–1)
MONOCYTES NFR BLD: 8.8 % (ref 4–15)
NEUTROPHILS # BLD AUTO: 4.7 K/UL (ref 1.8–7.7)
NEUTROPHILS NFR BLD: 69.4 % (ref 38–73)
NRBC BLD-RTO: 0 /100 WBC
PLATELET # BLD AUTO: 296 K/UL (ref 150–450)
PMV BLD AUTO: 10.1 FL (ref 9.2–12.9)
POCT GLUCOSE: 199 MG/DL (ref 70–110)
POCT GLUCOSE: 208 MG/DL (ref 70–110)
POCT GLUCOSE: 300 MG/DL (ref 70–110)
POTASSIUM SERPL-SCNC: 4.4 MMOL/L (ref 3.5–5.1)
PROT SERPL-MCNC: 5.8 G/DL (ref 6–8.4)
RBC # BLD AUTO: 3.6 M/UL (ref 4–5.4)
SODIUM SERPL-SCNC: 139 MMOL/L (ref 136–145)
WBC # BLD AUTO: 6.83 K/UL (ref 3.9–12.7)

## 2023-12-21 PROCEDURE — 97535 SELF CARE MNGMENT TRAINING: CPT

## 2023-12-21 PROCEDURE — 25000003 PHARM REV CODE 250

## 2023-12-21 PROCEDURE — 80053 COMPREHEN METABOLIC PANEL: CPT

## 2023-12-21 PROCEDURE — 85025 COMPLETE CBC W/AUTO DIFF WBC: CPT

## 2023-12-21 PROCEDURE — 25000003 PHARM REV CODE 250: Performed by: STUDENT IN AN ORGANIZED HEALTH CARE EDUCATION/TRAINING PROGRAM

## 2023-12-21 PROCEDURE — 94761 N-INVAS EAR/PLS OXIMETRY MLT: CPT

## 2023-12-21 PROCEDURE — 97161 PT EVAL LOW COMPLEX 20 MIN: CPT

## 2023-12-21 PROCEDURE — 63600175 PHARM REV CODE 636 W HCPCS

## 2023-12-21 PROCEDURE — 97165 OT EVAL LOW COMPLEX 30 MIN: CPT

## 2023-12-21 PROCEDURE — 97116 GAIT TRAINING THERAPY: CPT

## 2023-12-21 PROCEDURE — 36415 COLL VENOUS BLD VENIPUNCTURE: CPT

## 2023-12-21 PROCEDURE — 82306 VITAMIN D 25 HYDROXY: CPT

## 2023-12-21 PROCEDURE — 99231 PR SUBSEQUENT HOSPITAL CARE,LEVL I: ICD-10-PCS | Mod: ,,, | Performed by: ANESTHESIOLOGY

## 2023-12-21 PROCEDURE — 97530 THERAPEUTIC ACTIVITIES: CPT

## 2023-12-21 PROCEDURE — 63600175 PHARM REV CODE 636 W HCPCS: Performed by: STUDENT IN AN ORGANIZED HEALTH CARE EDUCATION/TRAINING PROGRAM

## 2023-12-21 PROCEDURE — 99231 SBSQ HOSP IP/OBS SF/LOW 25: CPT | Mod: ,,, | Performed by: ANESTHESIOLOGY

## 2023-12-21 RX ORDER — CALCIUM CARBONATE 200(500)MG
1000 TABLET,CHEWABLE ORAL DAILY
Status: DISCONTINUED | OUTPATIENT
Start: 2023-12-21 | End: 2023-12-22 | Stop reason: HOSPADM

## 2023-12-21 RX ORDER — ROPIVACAINE HYDROCHLORIDE 2 MG/ML
INJECTION, SOLUTION EPIDURAL; INFILTRATION; PERINEURAL CONTINUOUS
Status: DISCONTINUED | OUTPATIENT
Start: 2023-12-21 | End: 2023-12-22 | Stop reason: HOSPADM

## 2023-12-21 RX ORDER — METHOCARBAMOL 500 MG/1
500 TABLET, FILM COATED ORAL EVERY 6 HOURS
Status: DISCONTINUED | OUTPATIENT
Start: 2023-12-21 | End: 2023-12-22 | Stop reason: HOSPADM

## 2023-12-21 RX ORDER — OXYCODONE HYDROCHLORIDE 5 MG/1
5 TABLET ORAL EVERY 4 HOURS PRN
Status: DISCONTINUED | OUTPATIENT
Start: 2023-12-21 | End: 2023-12-22 | Stop reason: HOSPADM

## 2023-12-21 RX ADMIN — CALCIUM CARBONATE (ANTACID) CHEW TAB 500 MG 1000 MG: 500 CHEW TAB at 08:12

## 2023-12-21 RX ADMIN — METHOCARBAMOL 500 MG: 500 TABLET ORAL at 08:12

## 2023-12-21 RX ADMIN — PREGABALIN 75 MG: 75 CAPSULE ORAL at 09:12

## 2023-12-21 RX ADMIN — INSULIN DETEMIR 5 UNITS: 100 INJECTION, SOLUTION SUBCUTANEOUS at 10:12

## 2023-12-21 RX ADMIN — POLYETHYLENE GLYCOL 3350 17 G: 17 POWDER, FOR SOLUTION ORAL at 09:12

## 2023-12-21 RX ADMIN — ACETAMINOPHEN 1000 MG: 325 TABLET ORAL at 11:12

## 2023-12-21 RX ADMIN — ASPIRIN 81 MG: 81 TABLET, COATED ORAL at 08:12

## 2023-12-21 RX ADMIN — METHOCARBAMOL 500 MG: 500 TABLET ORAL at 11:12

## 2023-12-21 RX ADMIN — METHOCARBAMOL 500 MG: 500 TABLET ORAL at 07:12

## 2023-12-21 RX ADMIN — OXYCODONE HYDROCHLORIDE 10 MG: 10 TABLET ORAL at 07:12

## 2023-12-21 RX ADMIN — Medication: at 12:12

## 2023-12-21 RX ADMIN — ATORVASTATIN CALCIUM 20 MG: 20 TABLET, FILM COATED ORAL at 08:12

## 2023-12-21 RX ADMIN — INSULIN ASPART 4 UNITS: 100 INJECTION, SOLUTION INTRAVENOUS; SUBCUTANEOUS at 05:12

## 2023-12-21 RX ADMIN — INSULIN ASPART 1 UNITS: 100 INJECTION, SOLUTION INTRAVENOUS; SUBCUTANEOUS at 09:12

## 2023-12-21 RX ADMIN — ASPIRIN 81 MG: 81 TABLET, COATED ORAL at 09:12

## 2023-12-21 RX ADMIN — CELECOXIB 200 MG: 200 CAPSULE ORAL at 08:12

## 2023-12-21 RX ADMIN — ACETAMINOPHEN 1000 MG: 325 TABLET ORAL at 05:12

## 2023-12-21 RX ADMIN — ACETAMINOPHEN 1000 MG: 325 TABLET ORAL at 07:12

## 2023-12-21 RX ADMIN — Medication 6 MG: at 09:12

## 2023-12-21 RX ADMIN — DOCUSATE SODIUM 100 MG: 100 CAPSULE, LIQUID FILLED ORAL at 09:12

## 2023-12-21 NOTE — PROGRESS NOTES
[facility] - [facility]  Orthopedics  Progress Note    Patient Name: Bianka Bernard  MRN: [id number]  Admission Date: 12/20/2023  Hospital Length of Stay: 0 days  Attending Provider: Jesse Witt*  Primary Care Provider: Pantera Burgos MD  Follow-up For: Procedure(s) (LRB):  ARTHROPLASTY, SHOULDER (Right)  REPAIR, ROTATOR CUFF (Right)    Post-Operative Day: 1 Day Post-Op  Subjective:     Principal Problem:Closed fracture of proximal end of right humerus    Principal Orthopedic Problem: s/p right r TSA 12/20/23     Interval History: NAEON. VSS. AF. Patient states that pain is well controlled. Voiding spontaneously. Dressing CDI. Perineuraxial catheter in place. Today needs to work with PT. Hgb stable at 10.1.     Plan for discharge today after working with PT.       Review of patient's allergies indicates:   Allergen Reactions    Sulfa (sulfonamide antibiotics) Hives       Current Facility-Administered Medications   Medication    acetaminophen tablet 1,000 mg    aspirin EC tablet 81 mg    atorvastatin tablet 20 mg    calcium carbonate 200 mg calcium (500 mg) chewable tablet 1,000 mg    celecoxib capsule 400 mg    And    celecoxib capsule 200 mg    dextrose 10% bolus 125 mL 125 mL    dextrose 10% bolus 250 mL 250 mL    docusate sodium capsule 100 mg    fentaNYL 50 mcg/mL injection  mcg    glucagon (human recombinant) injection 1 mg    glucose chewable tablet 16 g    glucose chewable tablet 24 g    insulin aspart U-100 pen 0-5 Units    insulin detemir U-100 (Levemir) pen 5 Units    melatonin tablet 6 mg    methocarbamoL tablet 500 mg    midazolam (VERSED) 1 mg/mL injection 0.5-4 mg    ondansetron injection 4 mg    ondansetron tablet 8 mg    oxyCODONE immediate release tablet 5 mg    polyethylene glycol packet 17 g    polyethylene glycol packet 17 g    pregabalin capsule 75 mg    ROPIvacaine (PF) 2 mg/ml (0.2%) solution    senna-docusate 8.6-50 mg per tablet 1 tablet    sodium chloride 0.9% flush 10 mL  "    Objective:     Vital Signs (Most Recent):  Temp: 96.4 °F (35.8 °C) (12/21/23 0736)  Pulse: 70 (12/21/23 0736)  Resp: 18 (12/21/23 0736)  BP: (!) 176/77 (12/21/23 0736)  SpO2: 100 % (12/21/23 0736) Vital Signs (24h Range):  Temp:  [96.4 °F (35.8 °C)-98 °F (36.7 °C)] 96.4 °F (35.8 °C)  Pulse:  [70-89] 70  Resp:  [10-22] 18  SpO2:  [95 %-100 %] 100 %  BP: ()/(46-77) 176/77     Weight: 51.3 kg (113 lb)  Height: 5' 3" (160 cm)  Body mass index is 20.02 kg/m².      Intake/Output Summary (Last 24 hours) at 12/21/2023 0951  Last data filed at 12/20/2023 1315  Gross per 24 hour   Intake 1250 ml   Output --   Net 1250 ml        Ortho/SPM Exam     General appearance - no acute distress, conversant  Musculoskeletal -  RUE  Dressing C/D/I  Full ROM of wrist and fingers  SILT M/U/R/AIN/PIN  WWP distally  Calves soft, nontender bilateral      Significant Labs: CBC:   Recent Labs   Lab 12/21/23  0712   WBC 6.83   HGB 10.1*   HCT 32.1*        CMP:   Recent Labs   Lab 12/21/23 0712      K 4.4      CO2 26   *   BUN 12   CREATININE 0.7   CALCIUM 8.9   PROT 5.8*   ALBUMIN 3.0*   BILITOT 0.7   ALKPHOS 74   AST 34   ALT 32   ANIONGAP 6*     All pertinent labs within the past 24 hours have been reviewed.    Significant Imaging: I have reviewed and interpreted all pertinent imaging results/findings.  Assessment/Plan:     * Closed fracture of proximal end of right humerus  Bianka Bernard is a 68 y.o. female s/p right RTSA 12/20/23, dressing CDI this morning. Pain well controlled, noe neuraxial catheter in place, NVI, 2+ radial pulse. Plan for discharge today after working with PT.    12.20.23 - R rTSA, rotator cuff repair, biceps tenodesis     Antibiotics x 24 hr  ASA 81mg BID x 6 wks for DVT prophylaxis     Ca, Vit D supplementation  Boost  PT  Fragility fx clinic referral     RUE  Nonweightbearing x 12 wks     PHASE 1: now until 2 wks postop  ROMAT hand, wrist, elbow.  Sling, may remove for hygiene and " ADLs     PHASE 2: 2-6 wks postop  ROMAT hand, wrist, elbow.  Shoulder Pendulums okay  Shoulder passive and active assisted ROMAT   Sling, may remove for hygiene ADLs and therapy     PHASE 3: 6-12 wks postop   D/c sling   ROMAT RUE  No resistance     PHASE 4: after 12 wks postop  ROMAT, WBAT        X-rays at subsequent followups:  Right shoulder     Postop follow-up ([facility]) at 2 weeks, 6 weeks, 3 months, 6 months, 1 ye          FLOR TAYLOR MD  Orthopedics  [facility] - Surgery

## 2023-12-21 NOTE — PLAN OF CARE
[facility] - [facility]  Discharge Assessment    Primary Care Provider: Pantera Burgos MD     Discharge Assessment (most recent)       BRIEF DISCHARGE ASSESSMENT - 12/21/23 0953          Discharge Planning    Assessment Type Discharge Planning Brief Assessment     Resource/Environmental Concerns none     Support Systems Spouse/significant other;Children     Equipment Currently Used at Home none     Current Living Arrangements home     Patient/Family Anticipates Transition to home with family     Patient/Family Anticipated Services at Transition none     Discharge Plan A Home with family                         Spoke with patient at bedside to complete d/c planning assessment. Patient lives with her spouse and adult son in a two-story home with 5 steps to enter. Patient's bedroom is on the first floor of home. Independent with ADL's no DME. Verified PCP, Pharmacy and health insurance. PT/OT eval pending. Patient will have help at home from spouse and son. Discharge Plan A and Plan B have been determined by review of patient's clinical status, future medical and therapeutic needs, and coverage/benefits for post-acute care in coordination with multidisciplinary team members.      Yasmine MARTEL  Case Management  [facility]  [phone]

## 2023-12-21 NOTE — PLAN OF CARE
Pt. Evaluated and goals established   Problem: Occupational Therapy  Goal: Occupational Therapy Goal  Description: Goals to be met by: 1/1/24     Patient will increase functional independence with ADLs by performing:    UE Dressing with Minimal Assistance.  LE Dressing with Minimal Assistance.  Grooming while standing at sink with Minimal Assistance.  Toilet transfer to toilet with Contact Guard Assistance.  Outcome: Ongoing, Progressing

## 2023-12-21 NOTE — PLAN OF CARE
12/21/23 1120   Post-Acute Status   Post-Acute Authorization Home Health   Home Health Status Referrals Sent   Discharge Plan   Discharge Plan A Home with family;Home Health     Referrals sent to [facility] for post-acute needs.    Yasmine MARTEL  Case Management  [facility]  [phone]

## 2023-12-21 NOTE — PLAN OF CARE
Problem: Adult Inpatient Plan of Care  Goal: Plan of Care Review  Outcome: Ongoing, Progressing  Goal: Optimal Comfort and Wellbeing  Outcome: Ongoing, Progressing     Problem: Diabetes Comorbidity  Goal: Blood Glucose Level Within Targeted Range  Outcome: Ongoing, Progressing     Problem: Impaired Wound Healing  Goal: Optimal Wound Healing  Outcome: Ongoing, Progressing

## 2023-12-21 NOTE — ANESTHESIA POST-OP PAIN MANAGEMENT
Acute Pain Service Progress Note    Bianka Bernard is a 68 y.o., female, [id number] with PMH of DM who presented due to R proximal humerus fracture after fall in [facility].    Surgery:  ARTHROPLASTY, SHOULDER (Right)  REPAIR, ROTATOR CUFF (Right)    Post Op Day #: 1    Catheter type: perineural  interscalene R    Infusion type: Ropivacaine 0.2%  [id number] DB    Problem List:    Active Hospital Problems    Diagnosis  POA    *Closed fracture of proximal end of right humerus [S42.201A]  Yes    Normocytic anemia [D64.9]  Yes    Type 2 diabetes mellitus without complication, without long-term current use of insulin [E11.9]  Yes      Resolved Hospital Problems   No resolved problems to display.       Subjective:     General appearance of alert, oriented, no complaints   Pain with rest: 8    Numbers   Pain with movement: 10    Numbers   Side Effects    1. Pruritis No    2. Nausea No    3. Motor Blockade No, 0=Ability to raise upper extremity    4. Sedation No, 1=awake and alert    Objective:     Catheter level R Interscalene   Catheter site clean, dry, intact      Vitals   Vitals:    12/21/23 0736   BP: (!) 176/77   Pulse: 70   Resp: 18   Temp: 35.8 °C (96.4 °F)        Labs    Admission on 12/20/2023   Component Date Value Ref Range Status    POCT Glucose 12/20/2023 162 (H)  70 - 110 mg/dL Final    Hemoglobin A1C 12/20/2023 7.1 (H)  4.0 - 5.6 % Final    Estimated Avg Glucose 12/20/2023 157 (H)  68 - 131 mg/dL Final    POCT Glucose 12/20/2023 199 (H)  70 - 110 mg/dL Final    POCT Glucose 12/20/2023 318 (H)  70 - 110 mg/dL Final    POCT Glucose 12/21/2023 199 (H)  70 - 110 mg/dL Final    Vit D, 25-Hydroxy 12/21/2023 24 (L)  30 - 96 ng/mL Final    WBC 12/21/2023 6.83  3.90 - 12.70 K/uL Final    RBC 12/21/2023 3.60 (L)  4.00 - 5.40 M/uL Final    Hemoglobin 12/21/2023 10.1 (L)  12.0 - 16.0 g/dL Final    Hematocrit 12/21/2023 32.1 (L)  37.0 - 48.5 % Final    MCV 12/21/2023 89  82 - 98 fL Final    MCH 12/21/2023 28.1  27.0 -  31.0 pg Final    MCHC 12/21/2023 31.5 (L)  32.0 - 36.0 g/dL Final    RDW 12/21/2023 14.7 (H)  11.5 - 14.5 % Final    Platelets 12/21/2023 296  150 - 450 K/uL Final    MPV 12/21/2023 10.1  9.2 - 12.9 fL Final    Immature Granulocytes 12/21/2023 0.3  0.0 - 0.5 % Final    Gran # (ANC) 12/21/2023 4.7  1.8 - 7.7 K/uL Final    Immature Grans (Abs) 12/21/2023 0.02  0.00 - 0.04 K/uL Final    Lymph # 12/21/2023 1.4  1.0 - 4.8 K/uL Final    Mono # 12/21/2023 0.6  0.3 - 1.0 K/uL Final    Eos # 12/21/2023 0.0  0.0 - 0.5 K/uL Final    Baso # 12/21/2023 0.03  0.00 - 0.20 K/uL Final    nRBC 12/21/2023 0  0 /100 WBC Final    Gran % 12/21/2023 69.4  38.0 - 73.0 % Final    Lymph % 12/21/2023 20.5  18.0 - 48.0 % Final    Mono % 12/21/2023 8.8  4.0 - 15.0 % Final    Eosinophil % 12/21/2023 0.6  0.0 - 8.0 % Final    Basophil % 12/21/2023 0.4  0.0 - 1.9 % Final    Differential Method 12/21/2023 Automated   Final    Sodium 12/21/2023 139  136 - 145 mmol/L Final    Potassium 12/21/2023 4.4  3.5 - 5.1 mmol/L Final    Chloride 12/21/2023 107  95 - 110 mmol/L Final    CO2 12/21/2023 26  23 - 29 mmol/L Final    Glucose 12/21/2023 157 (H)  70 - 110 mg/dL Final    BUN 12/21/2023 12  8 - 23 mg/dL Final    Creatinine 12/21/2023 0.7  0.5 - 1.4 mg/dL Final    Calcium 12/21/2023 8.9  8.7 - 10.5 mg/dL Final    Total Protein 12/21/2023 5.8 (L)  6.0 - 8.4 g/dL Final    Albumin 12/21/2023 3.0 (L)  3.5 - 5.2 g/dL Final    Total Bilirubin 12/21/2023 0.7  0.1 - 1.0 mg/dL Final    Alkaline Phosphatase 12/21/2023 74  55 - 135 U/L Final    AST 12/21/2023 34  10 - 40 U/L Final    ALT 12/21/2023 32  10 - 44 U/L Final    eGFR 12/21/2023 >60.0  >60 mL/min/1.73 m^2 Final    Anion Gap 12/21/2023 6 (L)  8 - 16 mmol/L Final        Meds   Current Facility-Administered Medications   Medication Dose Route Frequency Provider Last Rate Last Admin    acetaminophen tablet 1,000 mg  1,000 mg Oral 4 times per day Azar Abdi MD   1,000 mg at 12/21/23 0527    aspirin EC  tablet 81 mg  81 mg Oral BID Presley Lawson MD   81 mg at 12/21/23 0857    atorvastatin tablet 20 mg  20 mg Oral Daily Presley Lawson MD   20 mg at 12/21/23 0858    calcium carbonate 200 mg calcium (500 mg) chewable tablet 1,000 mg  1,000 mg Oral Daily Persley Lawson MD   1,000 mg at 12/21/23 0857    celecoxib capsule 400 mg  400 mg Oral Once Azar Abdi MD        And    celecoxib capsule 200 mg  200 mg Oral Daily Azar Abdi MD   200 mg at 12/21/23 0859    dextrose 10% bolus 125 mL 125 mL  12.5 g Intravenous PRN Socorro Farfan MD        dextrose 10% bolus 250 mL 250 mL  25 g Intravenous PRN Socorro Farfan MD        docusate sodium capsule 100 mg  100 mg Oral BID Presley Lawson MD   100 mg at 12/20/23 2101    fentaNYL 50 mcg/mL injection  mcg   mcg Intravenous Q5 Min PRN Kwaku Frazier MD   50 mcg at 12/20/23 0755    glucagon (human recombinant) injection 1 mg  1 mg Intramuscular PRN Socorro Farfan MD        glucose chewable tablet 16 g  16 g Oral PRN Socorro Farfan MD        glucose chewable tablet 24 g  24 g Oral PRN Socorro Farfan MD        insulin aspart U-100 pen 0-5 Units  0-5 Units Subcutaneous QID (AC + HS) PRN Socorro Farfan MD   2 Units at 12/20/23 2107    insulin detemir U-100 (Levemir) pen 5 Units  5 Units Subcutaneous QHS Socorro Farfan MD   5 Units at 12/20/23 2107    melatonin tablet 6 mg  6 mg Oral Nightly PRN Hadley Talbert MD        methocarbamoL tablet 500 mg  500 mg Oral Q6H Shazia Hernandez MD        midazolam (VERSED) 1 mg/mL injection 0.5-4 mg  0.5-4 mg Intravenous PRN Kwaku Frazier MD   2 mg at 12/20/23 0755    ondansetron injection 4 mg  4 mg Intravenous Q12H PRN Azar Abdi MD        ondansetron tablet 8 mg  8 mg Oral Q6H PRN Hadley Talbert MD        oxyCODONE immediate release tablet 5 mg  5 mg Oral Q4H PRN Shazia Hernandez MD        polyethylene glycol packet 17 g  17 g Oral Daily Shazia Hernandez MD        polyethylene  glycol packet 17 g  17 g Oral Daily PRN Hadley Talbert MD        pregabalin capsule 75 mg  75 mg Oral QHS Azar Abdi MD   75 mg at 12/20/23 2101    ROPIvacaine (PF) 2 mg/ml (0.2%) solution  0.1 mL/hr Perineural Continuous Azar Abdi MD 0.1 mL/hr at 12/20/23 1503 0.1 mL/hr at 12/20/23 1503    senna-docusate 8.6-50 mg per tablet 1 tablet  1 tablet Oral Daily Hadley Talbert MD   1 tablet at 12/20/23 1646    sodium chloride 0.9% flush 10 mL  10 mL Intravenous PRN Hadley Talbert MD            Anticoagulant dose ASA 81mg BID.    Assessment:     Pain control adequate. Reported 8/10 pain this morning upon waking up which resolved to around 3/10 with [place] patient demand bolus and Oxy 5. Checked catheter placement under u/s guidance and looks to be in good position with LA spread around the brachial plexus. Gave 12cc 2% Lidocaine. Has good level of block by ice test.    Plan:     Patient doing well, continue present treatment.    - Continue R Interscalene PNC at increased infusion rate [id number]  - Will need [facility] set up and teaching prior to d/c  - Continue multimodals: Tylenol 1g q6h, Celebrex 200mg daily, Robaxin 500mg QID, and Lyrica 75mg   - PRN Oxy 5 for breakthrough pain control      Case discussed with staff, Dr. Nichols; final recommendations per attestation above.     Thank you for your consult and allowing us to participate in the care of this patient. We will continue to follow along. Please call the Acute Pain Service/Anesthesia if you have any questions or concerns.

## 2023-12-21 NOTE — NURSING
Nurses Note -- 4 Eyes      12/21/2023   4:53 PM      Skin assessed during: Q Shift Change      [x] No Altered Skin Integrity Present    []Prevention Measures Documented      [] Yes- Altered Skin Integrity Present or Discovered   [] LDA Added if Not in Epic (Describe Wound)   [] New Altered Skin Integrity was Present on Admit and Documented in LDA   [] Wound Image Taken    Wound Care Consulted? No    Attending Nurse:  Nay Parker RN    Second RN/Staff Member:   CHACORTA Brunson

## 2023-12-21 NOTE — ADDENDUM NOTE
Addendum  created 12/21/23 1343 by Roberto Carlos Nichols MD    Charge Capture section accepted, Cosign clinical note with attestation

## 2023-12-21 NOTE — SUBJECTIVE & OBJECTIVE
Interval History: NAEO.  Glucose improved.    Review of Systems   Constitutional: Negative.    Respiratory: Negative.     Cardiovascular: Negative.    Gastrointestinal: Negative.    Musculoskeletal:  Positive for arthralgias (shoulder pain postop well controlled).   Neurological: Negative.    Psychiatric/Behavioral: Negative.     All other systems reviewed and are negative.    Objective:     Vital Signs (Most Recent):  Temp: 98.5 °F (36.9 °C) (12/21/23 1150)  Pulse: 80 (12/21/23 1150)  Resp: 16 (12/21/23 1150)  BP: (!) 129/59 (12/21/23 1150)  SpO2: (!) 94 % (12/21/23 1150) Vital Signs (24h Range):  Temp:  [96.4 °F (35.8 °C)-98.5 °F (36.9 °C)] 98.5 °F (36.9 °C)  Pulse:  [70-89] 80  Resp:  [10-22] 16  SpO2:  [94 %-100 %] 94 %  BP: (110-176)/(55-77) 129/59     Weight: 51.3 kg (113 lb)  Body mass index is 20.02 kg/m².  No intake or output data in the 24 hours ending 12/21/23 1450      Physical Exam  Vitals reviewed.   Constitutional:       General: She is not in acute distress.     Appearance: She is not ill-appearing.   HENT:      Mouth/Throat:      Mouth: Mucous membranes are dry.   Cardiovascular:      Rate and Rhythm: Normal rate and regular rhythm.      Pulses: Normal pulses.      Heart sounds: Normal heart sounds.   Pulmonary:      Effort: Pulmonary effort is normal.      Breath sounds: Normal breath sounds.   Abdominal:      Palpations: Abdomen is soft.      Tenderness: There is no abdominal tenderness.   Musculoskeletal:      Right lower leg: No edema.      Left lower leg: No edema.      Comments: R shoulder in immobilizing sling   Skin:     General: Skin is warm and dry.      Capillary Refill: Capillary refill takes less than 2 seconds.   Neurological:      Mental Status: She is alert and oriented to person, place, and time. Mental status is at baseline.             Significant Labs: All pertinent labs within the past 24 hours have been reviewed.  CBC:   Recent Labs   Lab 12/21/23  0712   WBC 6.83   HGB 10.1*    HCT 32.1*        CMP:   Recent Labs   Lab 12/21/23  0712      K 4.4      CO2 26   *   BUN 12   CREATININE 0.7   CALCIUM 8.9   PROT 5.8*   ALBUMIN 3.0*   BILITOT 0.7   ALKPHOS 74   AST 34   ALT 32   ANIONGAP 6*     POCT Glucose:   Recent Labs   Lab 12/20/23  1716 12/20/23  2103 12/21/23  0242   POCTGLUCOSE 199* 318* 199*       Significant Imaging: I have reviewed all pertinent imaging results/findings within the past 24 hours.

## 2023-12-21 NOTE — CARE UPDATE
I have reviewed the chart of Biankaharjinder Parkschristy and collaborated with Jesse Witt* in the care of the patient who is hospitalized for the following:    Active Hospital Problems    Diagnosis    *Closed fracture of proximal end of right humerus    Normocytic anemia    Type 2 diabetes mellitus without complication, without long-term current use of insulin          I have reviewed Biankaharjinder Santanavalery with the multidisciplinary team during discharge huddle.       Francisca Abel PA-C  [facility]

## 2023-12-21 NOTE — NURSING
Pt ambulated in room and to bathroom x2. Pt tolerated well. Nurse assisted pt back in bed. Another ice pack was applied to the right arm sling. Neurovascular check WDL with an exception of decreased sensation in fingertips. Pulse +2 , Cap refill < 3sec.  Pain level 3/10. Pt expresses no other needs at this time. Call light within reach. Plan of care ongoing.

## 2023-12-21 NOTE — ASSESSMENT & PLAN NOTE
Bianka Bernard is a 68 y.o. female s/p right RTSA 12/20/23, dressing CDI this morning. Pain well controlled, noe neuraxial catheter in place, NVI, 2+ radial pulse. Plan for discharge today after working with PT.    12.20.23 - R rTSA, rotator cuff repair, biceps tenodesis     Antibiotics x 24 hr  ASA 81mg BID x 6 wks for DVT prophylaxis     Ca, Vit D supplementation  Boost  PT  Fragility fx clinic referral     RUE  Nonweightbearing x 12 wks     PHASE 1: now until 2 wks postop  ROMAT hand, wrist, elbow.  Sling, may remove for hygiene and ADLs     PHASE 2: 2-6 wks postop  ROMAT hand, wrist, elbow.  Shoulder Pendulums okay  Shoulder passive and active assisted ROMAT   Sling, may remove for hygiene ADLs and therapy     PHASE 3: 6-12 wks postop   D/c sling   ROMAT RUE  No resistance     PHASE 4: after 12 wks postop  ROMAT, WBAT        X-rays at subsequent followups:  Right shoulder     Postop follow-up (Miryam) at 2 weeks, 6 weeks, 3 months, 6 months, 1 ye

## 2023-12-21 NOTE — PROGRESS NOTES
[facility] Medicine  Progress Note    Patient Name: Bianka Bernard  MRN: [id number]  Patient Class: OP- Outpatient Recovery   Admission Date: 12/20/2023  Length of Stay: 0 days  Attending Physician: Jesse Witt*  Primary Care Provider: Pantera Burgos MD        Subjective:     Principal Problem:Closed fracture of proximal end of right humerus        HPI:  Bianka Bernard is a 68 year old woman with type 2 diabetes who initially presented to the ER on 12/13 and was found to have a right shoulder fracture. She presents to [facility] on 12/20 for right shoulder arthroplasty and rotator cuff repair with Dr Witt.     Medicine consults team was consulted for diabetes management. Patient reports she has no other comorbidities.   Last A1c from 5 years ago was 8.3%. No other labs to review in care everywhere.  Patient has a PCP outside of [facility] and reports that her most recent A1c was 6.8%. Patient takes metformin 1000mg BID, Jardiance 10mg, and Rybelsus 3mg at home. She does not check BG at home due to fear of needles.   Morning of the procedure patient had a fasting BG of 166. She received 8mg of dexamethasone prior to surgery at 8am. Postop BG was 179 per nursing staff (this did not cross over to Epic). At time of interview patient had complaints of hunger, and had well controlled shoulder pain.       Overview/Hospital Course:  No notes on file    Interval History: NAEO.  Glucose improved.    Review of Systems   Constitutional: Negative.    Respiratory: Negative.     Cardiovascular: Negative.    Gastrointestinal: Negative.    Musculoskeletal:  Positive for arthralgias (shoulder pain postop well controlled).   Neurological: Negative.    Psychiatric/Behavioral: Negative.     All other systems reviewed and are negative.    Objective:     Vital Signs (Most Recent):  Temp: 98.5 °F (36.9 °C) (12/21/23 1150)  Pulse: 80 (12/21/23 1150)  Resp: 16 (12/21/23 1150)  BP: (!) 129/59 (12/21/23 1150)  SpO2: (!) 94 %  (12/21/23 1150) Vital Signs (24h Range):  Temp:  [96.4 °F (35.8 °C)-98.5 °F (36.9 °C)] 98.5 °F (36.9 °C)  Pulse:  [70-89] 80  Resp:  [10-22] 16  SpO2:  [94 %-100 %] 94 %  BP: (110-176)/(55-77) 129/59     Weight: 51.3 kg (113 lb)  Body mass index is 20.02 kg/m².  No intake or output data in the 24 hours ending 12/21/23 1450      Physical Exam  Vitals reviewed.   Constitutional:       General: She is not in acute distress.     Appearance: She is not ill-appearing.   HENT:      Mouth/Throat:      Mouth: Mucous membranes are dry.   Cardiovascular:      Rate and Rhythm: Normal rate and regular rhythm.      Pulses: Normal pulses.      Heart sounds: Normal heart sounds.   Pulmonary:      Effort: Pulmonary effort is normal.      Breath sounds: Normal breath sounds.   Abdominal:      Palpations: Abdomen is soft.      Tenderness: There is no abdominal tenderness.   Musculoskeletal:      Right lower leg: No edema.      Left lower leg: No edema.      Comments: R shoulder in immobilizing sling   Skin:     General: Skin is warm and dry.      Capillary Refill: Capillary refill takes less than 2 seconds.   Neurological:      Mental Status: She is alert and oriented to person, place, and time. Mental status is at baseline.             Significant Labs: All pertinent labs within the past 24 hours have been reviewed.  CBC:   Recent Labs   Lab 12/21/23  0712   WBC 6.83   HGB 10.1*   HCT 32.1*        CMP:   Recent Labs   Lab 12/21/23  0712      K 4.4      CO2 26   *   BUN 12   CREATININE 0.7   CALCIUM 8.9   PROT 5.8*   ALBUMIN 3.0*   BILITOT 0.7   ALKPHOS 74   AST 34   ALT 32   ANIONGAP 6*     POCT Glucose:   Recent Labs   Lab 12/20/23  1716 12/20/23  2103 12/21/23  0242   POCTGLUCOSE 199* 318* 199*       Significant Imaging: I have reviewed all pertinent imaging results/findings within the past 24 hours.    Assessment/Plan:      Vitamin D insufficiency  Vitamin D level 24 which meets criteria for  insufficiency.    Recommendations:  - Commence Vit. D3 tablet 1000U daily  - PCP follow-up      Type 2 diabetes mellitus without complication, without long-term current use of insulin  Hospital medicine consulted for diabetes management postoperatively. Morning of the procedure patient had a fasting BG of 166. She received 8mg of dexamethasone prior to surgery at 8am. Postop BG was 179 per nursing staff (this did not cross over to Epic).  Home regimen: metformin 1000mg BID, Jardiance 10mg, and Rybelsus 3mg. She does not check BG at home due to fear of needles.     Patient's FSGs are controlled on current medication regimen.  Last A1c reviewed-   Lab Results   Component Value Date    HGBA1C 7.1 (H) 12/20/2023     Most recent fingerstick glucose reviewed-   Recent Labs   Lab 12/20/23  0641   POCTGLUCOSE 162*       Current correctional scale  Low  Maintain anti-hyperglycemic dose as follows-   Antihyperglycemics (From admission, onward)      Start     Stop Route Frequency Ordered    12/20/23 1649  insulin aspart U-100 pen 0-5 Units         -- SubQ Before meals & nightly PRN 12/20/23 1549          Recommendations:  - Glucose checks and LDSSI ACHS  - Inpatient BG goal 140-180  - If next few BG readings are elevated >180, will start 5U detemir at night  - Hypoglycemic protocol in place  - Diabetic diet  - Hold Oral hypoglycemics while patient is in the hospital, resume on discharge and f/u with PCP      VTE Risk Mitigation (From admission, onward)           Ordered     Place sequential compression device  Until discontinued         12/20/23 1517     Place sequential compression device  Until discontinued         12/20/23 1456     Place sequential compression device  Until discontinued         12/20/23 0630     IP VTE HIGH RISK PATIENT  Once         12/20/23 0630                    Discharge Planning   BLAYNE: 12/22/2023     Code Status: Full Code   Is the patient medically ready for discharge?: No    Reason for patient still  in hospital (select all that apply): Patient trending condition and Imaging  Discharge Plan A: Home with family, Home Health                  Jhonny Cornelius MD  Department of Hospital Medicine   [facility] - Surgery

## 2023-12-21 NOTE — NURSING
Pt AAO and vitals stable overnight. No significant events overnight. Neurovascular check WDL with an exception of decreased sensation in fingertips. Pulse +2 , Cap refill < 3sec. Pt rate pain 2/10 to RUE. Pt's pain was controlled with scheduled medications. Another ice pack was applied to the arm sling. Dr. Rand at bedside to readjust arm sling. SCDs in place. Call light within reach. Nurse informed pt to press call light when she needs to get out of bed to toilet. Pt expresses understanding. Plan of care ongoing.

## 2023-12-21 NOTE — PROGRESS NOTES
Pt and family present, consent to converting adductor PNC infusion to Nimbus.  Site CDI.  Educated regarding continued pain management, fall risk, signs of complications, continued monitoring, as well as discontinuing catheter on 12/25.  Understanding verbalized.

## 2023-12-21 NOTE — PLAN OF CARE
Patient does not require acute PT services at this time due to being at (I) PLOF and demonstrated safety with functional mobility, including transfers and ambulation    Problem: Physical Therapy  Goal: Physical Therapy Goal  Outcome: Met   12/21/2023

## 2023-12-21 NOTE — NURSING
Nurses Note -- 4 Eyes      12/20/2023   8:30 PM      Skin assessed during: Admit      [] No Altered Skin Integrity Present    []Prevention Measures Documented      [x] Yes- Altered Skin Integrity Present or Discovered   [x] LDA Added if Not in Epic (Describe Wound)    Bruise to right upper arm        [] New Altered Skin Integrity was Present on Admit and Documented in LDA   [] Wound Image Taken    Wound Care Consulted? No    Attending Nurse:  JUSTINE Laura     Second RN/Staff Member:  JUSTINE Deras

## 2023-12-21 NOTE — SUBJECTIVE & OBJECTIVE
Principal Problem:Closed fracture of proximal end of right humerus    Principal Orthopedic Problem: s/p right r TSA 12/20/23     Interval History: NAEON. VSS. AF. Patient states that pain is well controlled. Voiding spontaneously. Dressing CDI. Perineuraxial catheter in place. Today needs to work with PT. Hgb stable at 10.1.     Plan for discharge today after working with PT.       Review of patient's allergies indicates:   Allergen Reactions    Sulfa (sulfonamide antibiotics) Hives       Current Facility-Administered Medications   Medication    acetaminophen tablet 1,000 mg    aspirin EC tablet 81 mg    atorvastatin tablet 20 mg    calcium carbonate 200 mg calcium (500 mg) chewable tablet 1,000 mg    celecoxib capsule 400 mg    And    celecoxib capsule 200 mg    dextrose 10% bolus 125 mL 125 mL    dextrose 10% bolus 250 mL 250 mL    docusate sodium capsule 100 mg    fentaNYL 50 mcg/mL injection  mcg    glucagon (human recombinant) injection 1 mg    glucose chewable tablet 16 g    glucose chewable tablet 24 g    insulin aspart U-100 pen 0-5 Units    insulin detemir U-100 (Levemir) pen 5 Units    melatonin tablet 6 mg    methocarbamoL tablet 500 mg    midazolam (VERSED) 1 mg/mL injection 0.5-4 mg    ondansetron injection 4 mg    ondansetron tablet 8 mg    oxyCODONE immediate release tablet 5 mg    polyethylene glycol packet 17 g    polyethylene glycol packet 17 g    pregabalin capsule 75 mg    ROPIvacaine (PF) 2 mg/ml (0.2%) solution    senna-docusate 8.6-50 mg per tablet 1 tablet    sodium chloride 0.9% flush 10 mL     Objective:     Vital Signs (Most Recent):  Temp: 96.4 °F (35.8 °C) (12/21/23 0736)  Pulse: 70 (12/21/23 0736)  Resp: 18 (12/21/23 0736)  BP: (!) 176/77 (12/21/23 0736)  SpO2: 100 % (12/21/23 0736) Vital Signs (24h Range):  Temp:  [96.4 °F (35.8 °C)-98 °F (36.7 °C)] 96.4 °F (35.8 °C)  Pulse:  [70-89] 70  Resp:  [10-22] 18  SpO2:  [95 %-100 %] 100 %  BP: ()/(46-77) 176/77     Weight: 51.3 kg  "(113 lb)  Height: 5' 3" (160 cm)  Body mass index is 20.02 kg/m².      Intake/Output Summary (Last 24 hours) at 12/21/2023 0951  Last data filed at 12/20/2023 1315  Gross per 24 hour   Intake 1250 ml   Output --   Net 1250 ml        Ortho/SPM Exam     General appearance - no acute distress, conversant  Musculoskeletal -  RUE  Dressing C/D/I  Full ROM of wrist and fingers  SILT M/U/R/AIN/PIN  WWP distally  Calves soft, nontender bilateral      Significant Labs: CBC:   Recent Labs   Lab 12/21/23  0712   WBC 6.83   HGB 10.1*   HCT 32.1*        CMP:   Recent Labs   Lab 12/21/23  0712      K 4.4      CO2 26   *   BUN 12   CREATININE 0.7   CALCIUM 8.9   PROT 5.8*   ALBUMIN 3.0*   BILITOT 0.7   ALKPHOS 74   AST 34   ALT 32   ANIONGAP 6*     All pertinent labs within the past 24 hours have been reviewed.    Significant Imaging: I have reviewed and interpreted all pertinent imaging results/findings.  "

## 2023-12-21 NOTE — ASSESSMENT & PLAN NOTE
Vitamin D level 24 which meets criteria for insufficiency.    Recommendations:  - Commence Vit. D3 tablet 1000U daily  - PCP follow-up

## 2023-12-21 NOTE — PT/OT/SLP EVAL
Physical Therapy Co-Evaluation and Discharge Note    OT present for coeval due to pt's multiple medical comorbidities and functional/cognition deficits requiring two skilled therapists to appropriately progress pt's musculoskeletal strength, neuromuscular control, and endurance while taking into consideration medical acuity and pt safety.    Patient Name:  Bianka Bernard   MRN:  [id number]    Recommendations:     Discharge Recommendations: Low Intensity Therapy  Discharge Equipment Recommendations: none   Barriers to discharge: None    Assessment:     Bianka Bernard is a 68 y.o. female admitted with a medical diagnosis of Closed fracture of proximal end of right humerus. .  At this time, patient is functioning at their prior level of function and does not require further acute PT services.     Recent Surgery: Procedure(s) (LRB):  ARTHROPLASTY, SHOULDER (Right)  REPAIR, ROTATOR CUFF (Right) 1 Day Post-Op    Plan:     During this hospitalization, patient does not require further acute PT services.  Please re-consult if situation changes.      Subjective     Chief Complaint: R shoulder pain  Patient/Family Comments/goals: Pt agreeable to PT  Pain/Comfort:  Pain Rating 1: 6/10  Location - Side 1: Right  Location - Orientation 1: generalized  Location 1: shoulder  Pain Addressed 1: Reposition, Distraction  Pain Rating Post-Intervention 1: 6/10    Patients cultural, spiritual, Quaker conflicts given the current situation: no    Patient History:     Living Environment: Pt lives with spouse in 2SH with 4 CRISTINA and no HR. Pt bedroom/bathroom on 1st floor. Bathroom: tub/shower combo   Prior Level of Function: IND with amb and ADL  DME owned: none  Caregiver Assistance: spouse    Objective:     Communicated with RN prior to session.  Patient found HOB elevated with perineural catheter upon PT entry to room.    General Precautions: Standard, fall    Orthopedic Precautions:RUE non weight bearing   Braces: Shoulder abduction  brace  Respiratory Status: Room air    Exams:  Sensation:    -       Intact  RLE ROM: WFL  RLE Strength: WFL  LLE ROM: WFL  LLE Strength: WFL    Functional Mobility:    Bed Mobility:   Supine > Sit: minimum assistance    Transfers:   Sit <> Stand Transfer: contact guard assistance from EOB without AD    Balance:   Sitting balance: FAIR+: Maintains balance through MINIMAL excursions of active trunk motion  Standing balance:   FAIR: Maintains without assist but unable to take challenges  FAIR+: Needs CLOSE SUPERVISION during gait and is able to right self with minor LOB                 Gait:  Distance: 102 ft   Assistive Device: no AD  Assistance Level: stand by assistance  Gait Assessment: Pt amb with increased millie initially needing vc for decreased millie. Pt had 1 minor LOB able to self correct due to dizziness.    Stairs:  Pt ascended/descended 4 stair(s) with HHA of LUE with no handrails and  Minimal Assistance.   Pt ascended/descended with step to gait pattern.      AM-PAC 6 CLICK MOBILITY  Total Score:22       Treatment and Education:  Discharge planning discussed with pt who verbalized understanding. Pt educated on gradual increase in activity when returning home.    Pt educated on tip to reduce fall risk and safety with mobility and using call button for assistance from nursing staff with OOB mobility.  Pt educated on sitting up in chair throughout most of day  Pt educated on amb 2-3x per day with assistance from staff and increased movement during hospital stay.  All questions answered within the scope of PT.  White board updated accordingly.      AM-PAC 6 CLICK MOBILITY  Total Score:22     Patient left up in chair with all lines intact, call button in reach, and RN notified.    GOALS:   Multidisciplinary Problems       Physical Therapy Goals       Not on file              Multidisciplinary Problems (Resolved)          Problem: Physical Therapy    Goal Priority Disciplines Outcome Goal Variances  Interventions   Physical Therapy Goal   (Resolved)     PT, PT/OT Met                         History:     Past Medical History:   Diagnosis Date    Cataract     Diabetes mellitus, type 2     Hematuria     Long hx of this.  Being followed by Nephrologist, Dr. Burgos at [facility].       Past Surgical History:   Procedure Laterality Date    ARTHROPLASTY OF SHOULDER Right 12/20/2023    Procedure: ARTHROPLASTY, SHOULDER;  Surgeon: Jesse Witt MD;  Location: [facility] OR [address] [place] [address];  Service: Orthopedics;  Laterality: Right;    BREAST BIOPSY Left     core    BREAST CYST ASPIRATION Bilateral     ROTATOR CUFF REPAIR Right 12/20/2023    Procedure: REPAIR, ROTATOR CUFF;  Surgeon: Jesse Witt MD;  Location: [facility] OR [address] [place] [address];  Service: Orthopedics;  Laterality: Right;    STAPEDECTOMY Right 1/29/2019    Procedure: STAPEDECTOMY;  Surgeon: Luis Solorio MD;  Location: [address];  Service: ENT;  Laterality: Right;       Time Tracking:     PT Received On: 12/21/23  PT Start Time: 1028     PT Stop Time: 1103  PT Total Time (min): 35 min     Billable Minutes: Evaluation 10, Gait Training 17, and Therapeutic Activity 8      12/21/2023

## 2023-12-21 NOTE — PT/OT/SLP EVAL
Occupational Therapy   Co-Evaluation    Name: Bianka Bernard  MRN: [id number]  Admitting Diagnosis: Closed fracture of proximal end of right humerus  Recent Surgery: Procedure(s) (LRB):  ARTHROPLASTY, SHOULDER (Right)  REPAIR, ROTATOR CUFF (Right) 1 Day Post-Op    Recommendations:     Discharge Recommendations: Low Intensity Therapy  Discharge Equipment Recommendations:  none  Barriers to discharge:  None    Assessment:     Bianka Bernard is a 68 y.o. female with a medical diagnosis of Closed fracture of proximal end of right humerus.  She presents with mild c/o pain however tolerated UE Dressing task and fxl mob in the hallway. Performance deficits affecting function: weakness, impaired endurance, impaired self care skills, impaired functional mobility, impaired sensation, decreased upper extremity function, pain, edema, orthopedic precautions, impaired muscle length, impaired fine motor, impaired skin, decreased ROM, impaired balance.  Patient currently demonstrates a need for low intensity therapy on a scheduled basis secondary to a decline in functional status due to surgical procedure    Rehab Prognosis: Good; patient would benefit from acute skilled OT services to address these deficits and reach maximum level of function.       Plan:     Patient to be seen 4 x/week to address the above listed problems via self-care/home management, therapeutic exercises, neuromuscular re-education  Plan of Care Expires: 01/20/24  Plan of Care Reviewed with: patient    Subjective     Chief Complaint: R shoulder pain   Patient/Family Comments/goals: Pt.reports can she keep on her personal top    Occupational Profile:  Living Environment: Lives with spouse in a 2SH with a 4STE 0HR, full bed and bath on 1st floor, with a t/s combo  Previous level of function: Ind in all ADLs and Mobility   Roles and Routines: Works and Drives  Equipment Used at Home: none  Assistance upon Discharge: Spouse    Pain/Comfort:  Pain Rating 1: 6/10  Location -  Side 1: Right  Location - Orientation 1: generalized  Location 1: shoulder  Pain Addressed 1: Reposition, Distraction  Pain Rating Post-Intervention 1: 6/10    Patients cultural, spiritual, Gnosticist conflicts given the current situation: no    Objective:     Communicated with: Patient found HOB elevated with perineural catheter, cryotherapy upon OT entry to room.    General Precautions: Standard, fall  Orthopedic Precautions: RUE non weight bearing  Braces: Shoulder abduction brace  Respiratory Status: Room air    Occupational Performance:    Bed Mobility:    Patient completed Supine to Sit with minimum assistance    Functional Mobility/Transfers:  Patient completed Sit <> Stand Transfer with contact guard assistance  with  no assistive device   Functional Mobility: Pt demonstrated functional mobility training to simulate household distance (102ft) with no AD with contact guard assistance and 1 minor LOB (self correct 2/2 dizziness) and fair visual search and navigation strategies.     Activities of Daily Living:  Upper Body Dressing: moderate assistance don and doff shoulder abduction brace    Upper Body Dressing: Max assistance don and doff personal camisole top  Educated on reynaldo dressing techniques      Cognitive/Visual Perceptual:  Cognitive/Psychosocial Skills:     -       Oriented to: Person, Place, and Situation   -       Follows Commands/attention:Follows multistep  commands  -       Communication: clear/fluent  -       Memory: No Deficits noted  -       Safety awareness/insight to disability: intact   -       Mood/Affect/Coping skills/emotional control: Appropriate to situation  Visual/Perceptual:      -wear eyeglasses      Physical Exam:  Balance:    Static Sitting:  Fair +  Static Standing: Fair +  Dynamic Standing: Fair  Dominant hand:    -       R hand  Upper Extremity Range of Motion:     -       Right Upper Extremity: PROM shoulder abductions and ~20-30 degrees, Shoulder flexion ~40-50 degrees,  limited 2/2 pain   -       Left Upper Extremity: WFL  Upper Extremity Strength:    -       Right Upper Extremity: NATHALIA Precautions  -       Left Upper Extremity: WFL   Strength:    -       Right Upper Extremity: NATHALIA Precautions   -       Left Upper Extremity: WFL    AMPAC 6 Click ADL:  AMPAC Total Score: 16    Treatment & Education:  Pt.educated and reviewed WB precautions : NWB RUE: ROMAT hand, wrist, elbow  Per MD noted: Sling, may remove for hygiene and ADLs  Pt.educated on edema/pain management,dressing technique, shoe etiquette, and energy conservation upon d/c  Pt.educated on care and wear schedule, along with proper donning and doffing   Pt educated on role of occupational therapy, POC, and safety during ADLs and functional mobility. Pt and OT discussed importance of safe, continued mobility to optimize daily living skills. Pt verbalized understanding. Pt given instruction to call for medical staff/nurse for assistance.   PT present for coeval due to pt's multiple medical comorbidities and functional/cognition deficits requiring two skilled therapists to appropriately progress pt's musculoskeletal strength, neuromuscular control, and endurance while taking into consideration medical acuity and pt safety.       Patient left up in chair with all lines intact and call button in reach    GOALS:   Multidisciplinary Problems       Occupational Therapy Goals          Problem: Occupational Therapy    Goal Priority Disciplines Outcome Interventions   Occupational Therapy Goal     OT, PT/OT Ongoing, Progressing    Description: Goals to be met by: 1/1/24     Patient will increase functional independence with ADLs by performing:    UE Dressing with Minimal Assistance.  LE Dressing with Minimal Assistance.  Grooming while standing at sink with Minimal Assistance.  Toilet transfer to toilet with Contact Guard Assistance.                       History:     Past Medical History:   Diagnosis Date    Cataract     Diabetes  mellitus, type 2     Hematuria     Long hx of this.  Being followed by Nephrologist, Dr. Burgos at [facility].         Past Surgical History:   Procedure Laterality Date    ARTHROPLASTY OF SHOULDER Right 12/20/2023    Procedure: ARTHROPLASTY, SHOULDER;  Surgeon: Jesse Witt MD;  Location: [address];  Service: Orthopedics;  Laterality: Right;    BREAST BIOPSY Left     core    BREAST CYST ASPIRATION Bilateral     ROTATOR CUFF REPAIR Right 12/20/2023    Procedure: REPAIR, ROTATOR CUFF;  Surgeon: Jesse Witt MD;  Location: [address];  Service: Orthopedics;  Laterality: Right;    STAPEDECTOMY Right 1/29/2019    Procedure: STAPEDECTOMY;  Surgeon: Luis Solorio MD;  Location: [address];  Service: ENT;  Laterality: Right;       Time Tracking:     OT Date of Treatment: 12/21/23  OT Start Time: 1028  OT Stop Time: 1103  OT Total Time (min): 35 min    Billable Minutes:Evaluation 10  Self Care/Home Management 25    12/21/2023

## 2023-12-21 NOTE — ANESTHESIA POSTPROCEDURE EVALUATION
Anesthesia Post Evaluation    Patient: Bianka Bernard    Procedure(s) Performed: Procedure(s) (LRB):  ARTHROPLASTY, SHOULDER (Right)  REPAIR, ROTATOR CUFF (Right)    Final Anesthesia Type: general      Patient location during evaluation: PACU  Patient participation: Yes- Able to Participate  Level of consciousness: awake and alert  Post-procedure vital signs: reviewed and stable  Pain management: adequate  Airway patency: patent    PONV status at discharge: No PONV  Anesthetic complications: no      Cardiovascular status: blood pressure returned to baseline  Respiratory status: unassisted  Hydration status: euvolemic  Follow-up not needed.              Vitals Value Taken Time   /59 12/21/23 1150   Temp 36.9 °C (98.5 °F) 12/21/23 1150   Pulse 80 12/21/23 1150   Resp 16 12/21/23 1150   SpO2 94 % 12/21/23 1150         Event Time   Out of Recovery 14:54:00         Pain/Jason Score: Pain Rating Prior to Med Admin: 5 (12/21/2023 11:39 AM)  Pain Rating Post Med Admin: 1 (12/21/2023  6:27 AM)  Jason Score: 10 (12/20/2023  3:00 PM)

## 2023-12-22 ENCOUNTER — TELEPHONE (OUTPATIENT)
Dept: ORTHOPEDICS | Facility: CLINIC | Age: 68
End: 2023-12-22
Payer: COMMERCIAL

## 2023-12-22 VITALS
HEIGHT: 63 IN | WEIGHT: 113 LBS | SYSTOLIC BLOOD PRESSURE: 172 MMHG | HEART RATE: 85 BPM | DIASTOLIC BLOOD PRESSURE: 85 MMHG | TEMPERATURE: 98 F | BODY MASS INDEX: 20.02 KG/M2 | OXYGEN SATURATION: 96 % | RESPIRATION RATE: 16 BRPM

## 2023-12-22 LAB
POCT GLUCOSE: 154 MG/DL (ref 70–110)
POCT GLUCOSE: 187 MG/DL (ref 70–110)
POCT GLUCOSE: 203 MG/DL (ref 70–110)

## 2023-12-22 PROCEDURE — 25000003 PHARM REV CODE 250: Performed by: STUDENT IN AN ORGANIZED HEALTH CARE EDUCATION/TRAINING PROGRAM

## 2023-12-22 PROCEDURE — 25000003 PHARM REV CODE 250

## 2023-12-22 PROCEDURE — 99900035 HC TECH TIME PER 15 MIN (STAT)

## 2023-12-22 PROCEDURE — 97110 THERAPEUTIC EXERCISES: CPT

## 2023-12-22 PROCEDURE — 94799 UNLISTED PULMONARY SVC/PX: CPT

## 2023-12-22 PROCEDURE — 99231 PR SUBSEQUENT HOSPITAL CARE,LEVL I: ICD-10-PCS | Mod: ,,, | Performed by: ANESTHESIOLOGY

## 2023-12-22 PROCEDURE — 99231 SBSQ HOSP IP/OBS SF/LOW 25: CPT | Mod: ,,, | Performed by: ANESTHESIOLOGY

## 2023-12-22 PROCEDURE — 97535 SELF CARE MNGMENT TRAINING: CPT

## 2023-12-22 RX ORDER — ACETAMINOPHEN 500 MG
1000 TABLET ORAL EVERY 8 HOURS
Qty: 60 TABLET | Refills: 0 | Status: SHIPPED | OUTPATIENT
Start: 2023-12-22 | End: 2024-01-04 | Stop reason: SDUPTHER

## 2023-12-22 RX ORDER — METHOCARBAMOL 750 MG/1
750 TABLET, FILM COATED ORAL 3 TIMES DAILY PRN
Qty: 21 TABLET | Refills: 0 | Status: SHIPPED | OUTPATIENT
Start: 2023-12-22 | End: 2024-01-06

## 2023-12-22 RX ORDER — OXYCODONE HYDROCHLORIDE 5 MG/1
5 TABLET ORAL EVERY 6 HOURS PRN
Qty: 42 TABLET | Refills: 0 | Status: SHIPPED | OUTPATIENT
Start: 2023-12-22

## 2023-12-22 RX ORDER — PREGABALIN 75 MG/1
75 CAPSULE ORAL 2 TIMES DAILY
Qty: 60 CAPSULE | Refills: 0 | Status: SHIPPED | OUTPATIENT
Start: 2023-12-22 | End: 2024-01-30 | Stop reason: SDUPTHER

## 2023-12-22 RX ORDER — CHOLECALCIFEROL (VITAMIN D3) 25 MCG
1000 TABLET ORAL DAILY
Status: DISCONTINUED | OUTPATIENT
Start: 2023-12-22 | End: 2023-12-22 | Stop reason: HOSPADM

## 2023-12-22 RX ADMIN — POLYETHYLENE GLYCOL 3350 17 G: 17 POWDER, FOR SOLUTION ORAL at 09:12

## 2023-12-22 RX ADMIN — CALCIUM CARBONATE (ANTACID) CHEW TAB 500 MG 1000 MG: 500 CHEW TAB at 09:12

## 2023-12-22 RX ADMIN — METHOCARBAMOL 500 MG: 500 TABLET ORAL at 05:12

## 2023-12-22 RX ADMIN — OXYCODONE HYDROCHLORIDE 5 MG: 5 TABLET ORAL at 02:12

## 2023-12-22 RX ADMIN — METHOCARBAMOL 500 MG: 500 TABLET ORAL at 11:12

## 2023-12-22 RX ADMIN — CELECOXIB 200 MG: 200 CAPSULE ORAL at 09:12

## 2023-12-22 RX ADMIN — CHOLECALCIFEROL TAB 25 MCG (1000 UNIT) 1000 UNITS: 25 TAB at 09:12

## 2023-12-22 RX ADMIN — ACETAMINOPHEN 1000 MG: 325 TABLET ORAL at 05:12

## 2023-12-22 RX ADMIN — ATORVASTATIN CALCIUM 20 MG: 20 TABLET, FILM COATED ORAL at 09:12

## 2023-12-22 RX ADMIN — SENNOSIDES AND DOCUSATE SODIUM 1 TABLET: 8.6; 5 TABLET ORAL at 09:12

## 2023-12-22 RX ADMIN — DOCUSATE SODIUM 100 MG: 100 CAPSULE, LIQUID FILLED ORAL at 09:12

## 2023-12-22 RX ADMIN — ASPIRIN 81 MG: 81 TABLET, COATED ORAL at 09:12

## 2023-12-22 NOTE — PT/OT/SLP PROGRESS
Occupational Therapy   Treatment    Name: Bianka Bernard  MRN: [id number]  Admitting Diagnosis:  Closed fracture of proximal end of right humerus  2 Days Post-Op    Recommendations:     Discharge Recommendations: Low Intensity Therapy  Discharge Equipment Recommendations:  none  Barriers to discharge:  None    Assessment:     Bianka Bernard is a 68 y.o. female with a medical diagnosis of Closed fracture of proximal end of right humerus.  She presents with minor c/o pain however tolerated a dressing task, education on brace and ROM of the hand, wrist , and elbow. Performance deficits affecting function are weakness, impaired self care skills, impaired functional mobility, impaired balance, decreased upper extremity function, decreased coordination, pain, orthopedic precautions, impaired skin, edema, impaired fine motor.     Rehab Prognosis:  Good; patient would benefit from acute skilled OT services to address these deficits and reach maximum level of function.       Plan:     Patient to be seen 4 x/week to address the above listed problems via self-care/home management, neuromuscular re-education, therapeutic exercises  Plan of Care Expires: 01/20/24  Plan of Care Reviewed with: patient, spouse    Subjective     Chief Complaint: None verbalized  Patient/Family Comments/goals: Pt.reports she want to know how put her shirt on her restrictions with her shoulder  Pain/Comfort:  Pain Rating 1:  (did not rate)  Location - Side 1: Right  Location - Orientation 1: generalized  Location 1: shoulder  Pain Addressed 1: Reposition, Distraction    Objective:     Communicated with: RN prior to session.  Patient found HOB elevated with perineural catheter, cryotherapy upon OT entry to room.    General Precautions: Standard, fall    Orthopedic Precautions:RUE non weight bearing  Braces: Shoulder abduction brace  Respiratory Status: Room air     Occupational Performance:     Bed Mobility:    Patient completed Supine to Sit with minimum  assistance  Patient completed Sit to Supine with stand by assistance     Functional Mobility/Transfers:  Patient completed Sit <> Stand Transfer with contact guard assistance  with  no assistive device  2 trials at EOB     Activities of Daily Living:  Upper Body Dressing: moderate assistance [facility] gown and personal tiffanie anteriroly and don personal tiffanie seated at EOB   Lower Body Dressing: minimum assistance don personal undergarmnet and pants  Min A to don and doff Shoulder abduction brace      AMPAC 6 Click ADL: 16    Treatment & Education:  Pt.educated and reviewed WB precautions ROM limitation  Pt. issues and reviewed a handout a with written instruction with precautions and ROM limitation  Pt. Issued a hand out with ROM for Hand wrist and elbow with pictures and written instructions (exercise below)  10 reps of each   Elbow flexion/extension  Wrist flexion/extension  Pronation and supination   Ulnar and and  radial deviation   Hand squeezes   Pt.educated on edema/pain management,dressing techniques, and energy conservation upon d/c returning to home to maintain roles  Educated on proper wear and care of  Shoulder abduction brace  Pt educated on role of occupational therapy, POC, and safety during ADLs and functional mobility. Pt and OT discussed importance of safe, continued mobility to optimize daily living skills. Pt verbalized understanding. Pt given instruction to call for medical staff/nurse for assistance.     Patient left HOB elevated with all lines intact, call button in reach, and spouse present    GOALS:   Multidisciplinary Problems       Occupational Therapy Goals          Problem: Occupational Therapy    Goal Priority Disciplines Outcome Interventions   Occupational Therapy Goal     OT, PT/OT Ongoing, Progressing    Description: Goals to be met by: 1/1/24     Patient will increase functional independence with ADLs by performing:    UE Dressing with Minimal Assistance.  LE Dressing with  Minimal Assistance.  Grooming while standing at sink with Minimal Assistance.  Toilet transfer to toilet with Contact Guard Assistance.                       Time Tracking:     OT Date of Treatment: 12/22/23  OT Start Time: 1141  OT Stop Time: 1221  OT Total Time (min): 40 min    Billable Minutes:Self Care/Home Management 25  Therapeutic Exercise 15    OT/SHANDA: OT          12/22/2023

## 2023-12-22 NOTE — PLAN OF CARE
Hospital medicine plan of care note    Patient evaluated today. Sugars reasonably well controlled this morning (spike yesterday evening was secondary to no glucose checks or SSI provided throughout the day). Patient is being discharged today with home health.    - Continue oral diabetic regimen on discharge  - Follow up with PCP   - Prescribed Lyrica 75mg BID for postoperative pain, per acute pain management's recommendation  - Patient to continue over the counter vitamin D 1000U daily for vitamin D deficiency    Hospital medicine will sign off.    Socorro Farfan MD  [id number]

## 2023-12-22 NOTE — TELEPHONE ENCOUNTER
Spoke with pt.  She is still admitted and hoping to discharge home today.   Confirmed pt's appointment in clinic with Dr Witt on 12/27/23.   Pt given my contact info should any questions or concerns arise.

## 2023-12-22 NOTE — ANESTHESIA POST-OP PAIN MANAGEMENT
Acute Pain Service Progress Note    Bianka Bernard is a 68 y.o., female, [id number] with PMH of DM who presented due to R proximal humerus fracture after fall in [facility].    Surgery:  ARTHROPLASTY, SHOULDER (Right)  REPAIR, ROTATOR CUFF (Right)    Post Op Day #: 2    Catheter type: perineural  interscalene R    Infusion type: Ropivacaine 0.2%  [id number]    Problem List:    Active Hospital Problems    Diagnosis  POA    *Closed fracture of proximal end of right humerus [S42.201A]  Yes    Normocytic anemia [D64.9]  Yes    Vitamin D insufficiency [E55.9]  No    Type 2 diabetes mellitus without complication, without long-term current use of insulin [E11.9]  Yes      Resolved Hospital Problems   No resolved problems to display.       Subjective:     General appearance of alert, oriented, no complaints   Pain with rest: 8    Numbers   Pain with movement: 10    Numbers   Side Effects    1. Pruritis No    2. Nausea No    3. Motor Blockade No, 0=Ability to raise upper extremity    4. Sedation No, 1=awake and alert    Objective:     Catheter level R Interscalene   Catheter site clean, dry, intact      Vitals   Vitals:    12/22/23 0332   BP: 128/60   Pulse: 74   Resp: 15   Temp: 36.6 °C (97.9 °F)        Labs    Admission on 12/20/2023   Component Date Value Ref Range Status    POCT Glucose 12/20/2023 162 (H)  70 - 110 mg/dL Final    Hemoglobin A1C 12/20/2023 7.1 (H)  4.0 - 5.6 % Final    Estimated Avg Glucose 12/20/2023 157 (H)  68 - 131 mg/dL Final    POCT Glucose 12/20/2023 199 (H)  70 - 110 mg/dL Final    POCT Glucose 12/20/2023 318 (H)  70 - 110 mg/dL Final    POCT Glucose 12/21/2023 199 (H)  70 - 110 mg/dL Final    Vit D, 25-Hydroxy 12/21/2023 24 (L)  30 - 96 ng/mL Final    WBC 12/21/2023 6.83  3.90 - 12.70 K/uL Final    RBC 12/21/2023 3.60 (L)  4.00 - 5.40 M/uL Final    Hemoglobin 12/21/2023 10.1 (L)  12.0 - 16.0 g/dL Final    Hematocrit 12/21/2023 32.1 (L)  37.0 - 48.5 % Final    MCV 12/21/2023 89  82 - 98 fL  Final    MCH 12/21/2023 28.1  27.0 - 31.0 pg Final    MCHC 12/21/2023 31.5 (L)  32.0 - 36.0 g/dL Final    RDW 12/21/2023 14.7 (H)  11.5 - 14.5 % Final    Platelets 12/21/2023 296  150 - 450 K/uL Final    MPV 12/21/2023 10.1  9.2 - 12.9 fL Final    Immature Granulocytes 12/21/2023 0.3  0.0 - 0.5 % Final    Gran # (ANC) 12/21/2023 4.7  1.8 - 7.7 K/uL Final    Immature Grans (Abs) 12/21/2023 0.02  0.00 - 0.04 K/uL Final    Lymph # 12/21/2023 1.4  1.0 - 4.8 K/uL Final    Mono # 12/21/2023 0.6  0.3 - 1.0 K/uL Final    Eos # 12/21/2023 0.0  0.0 - 0.5 K/uL Final    Baso # 12/21/2023 0.03  0.00 - 0.20 K/uL Final    nRBC 12/21/2023 0  0 /100 WBC Final    Gran % 12/21/2023 69.4  38.0 - 73.0 % Final    Lymph % 12/21/2023 20.5  18.0 - 48.0 % Final    Mono % 12/21/2023 8.8  4.0 - 15.0 % Final    Eosinophil % 12/21/2023 0.6  0.0 - 8.0 % Final    Basophil % 12/21/2023 0.4  0.0 - 1.9 % Final    Differential Method 12/21/2023 Automated   Final    Sodium 12/21/2023 139  136 - 145 mmol/L Final    Potassium 12/21/2023 4.4  3.5 - 5.1 mmol/L Final    Chloride 12/21/2023 107  95 - 110 mmol/L Final    CO2 12/21/2023 26  23 - 29 mmol/L Final    Glucose 12/21/2023 157 (H)  70 - 110 mg/dL Final    BUN 12/21/2023 12  8 - 23 mg/dL Final    Creatinine 12/21/2023 0.7  0.5 - 1.4 mg/dL Final    Calcium 12/21/2023 8.9  8.7 - 10.5 mg/dL Final    Total Protein 12/21/2023 5.8 (L)  6.0 - 8.4 g/dL Final    Albumin 12/21/2023 3.0 (L)  3.5 - 5.2 g/dL Final    Total Bilirubin 12/21/2023 0.7  0.1 - 1.0 mg/dL Final    Alkaline Phosphatase 12/21/2023 74  55 - 135 U/L Final    AST 12/21/2023 34  10 - 40 U/L Final    ALT 12/21/2023 32  10 - 44 U/L Final    eGFR 12/21/2023 >60.0  >60 mL/min/1.73 m^2 Final    Anion Gap 12/21/2023 6 (L)  8 - 16 mmol/L Final    POCT Glucose 12/21/2023 300 (H)  70 - 110 mg/dL Final    POCT Glucose 12/21/2023 208 (H)  70 - 110 mg/dL Final    POCT Glucose 12/22/2023 154 (H)  70 - 110 mg/dL Final        Meds   Current  Facility-Administered Medications   Medication Dose Route Frequency Provider Last Rate Last Admin    acetaminophen tablet 1,000 mg  1,000 mg Oral 4 times per day Azar Abdi MD   1,000 mg at 12/22/23 0518    aspirin EC tablet 81 mg  81 mg Oral BID Presley Lawson MD   81 mg at 12/21/23 2145    atorvastatin tablet 20 mg  20 mg Oral Daily Presley Lawson MD   20 mg at 12/21/23 0858    calcium carbonate 200 mg calcium (500 mg) chewable tablet 1,000 mg  1,000 mg Oral Daily Presley Lawson MD   1,000 mg at 12/21/23 0857    celecoxib capsule 400 mg  400 mg Oral Once Azar Abdi MD        And    celecoxib capsule 200 mg  200 mg Oral Daily Azar Abdi MD   200 mg at 12/21/23 0859    dextrose 10% bolus 125 mL 125 mL  12.5 g Intravenous PRN Socorro Farfan MD        dextrose 10% bolus 250 mL 250 mL  25 g Intravenous PRN Socorro Farfan MD        docusate sodium capsule 100 mg  100 mg Oral BID Presley Lawson MD   100 mg at 12/21/23 2145    fentaNYL 50 mcg/mL injection  mcg   mcg Intravenous Q5 Min PRN Kwaku Frazier MD   50 mcg at 12/20/23 0755    glucagon (human recombinant) injection 1 mg  1 mg Intramuscular PRN Socorro Farfan MD        glucose chewable tablet 16 g  16 g Oral PRN Socorro Farfan MD        glucose chewable tablet 24 g  24 g Oral PRN Socorro Farfan MD        insulin aspart U-100 pen 0-5 Units  0-5 Units Subcutaneous QID (AC + HS) PRN Socorro Farfan MD   1 Units at 12/21/23 2150    insulin detemir U-100 (Levemir) pen 5 Units  5 Units Subcutaneous QHS Socorro Farfan MD   5 Units at 12/21/23 2205    melatonin tablet 6 mg  6 mg Oral Nightly PRN Hadley Talbert MD   6 mg at 12/21/23 2145    methocarbamoL tablet 500 mg  500 mg Oral Q6H Shazia Hernandez MD   500 mg at 12/22/23 0518    midazolam (VERSED) 1 mg/mL injection 0.5-4 mg  0.5-4 mg Intravenous PRN Kwaku Frazier MD   2 mg at 12/20/23 0755    ondansetron injection 4 mg  4 mg Intravenous Q12H PRN Azar Abdi,  MD        ondansetron tablet 8 mg  8 mg Oral Q6H PRN Hadley Talbert MD        oxyCODONE immediate release tablet 5 mg  5 mg Oral Q4H PRN Shazia Hernandez MD        polyethylene glycol packet 17 g  17 g Oral Daily Shazia Hernandez MD        polyethylene glycol packet 17 g  17 g Oral Daily PRN Hadley Talbert MD   17 g at 12/21/23 2144    pregabalin capsule 75 mg  75 mg Oral QHS Azar Abdi MD   75 mg at 12/21/23 2145    ROPIvacaine (PF) 2 mg/ml (0.2%) solution  0.1 mL/hr Perineural Continuous Azar Abdi MD 0.1 mL/hr at 12/20/23 1503 0.1 mL/hr at 12/20/23 1503    ropivacaine 0.2% [facility] PainPRO Pump infusion 500 ML   Perineural Continuous Shazia Hernandez MD   New Bag at 12/21/23 1230    senna-docusate 8.6-50 mg per tablet 1 tablet  1 tablet Oral Daily Hadley Talbert MD   1 tablet at 12/20/23 1646    sodium chloride 0.9% flush 10 mL  10 mL Intravenous PRN Hadley Talbert MD            Anticoagulant dose ASA 81mg BID.    Assessment:     Pain control adequate.     Plan:     Patient doing well, continue present treatment.    - Continue R Interscalene PNC at [id number] w [id number] DB    - Continue multimodals: Tylenol 1g q6h, Celebrex 200mg daily, Robaxin 500mg QID, and Lyrica 75mg   - PRN Oxy 5 for breakthrough pain control  - Not Dc'ed yesterday, will be discharged today. [facility] set up.       Case discussed with staff, Dr. Nichols; final recommendations per attestation above.     Thank you for your consult and allowing us to participate in the care of this patient. We will continue to follow along. Please call the Acute Pain Service/Anesthesia if you have any questions or concerns.

## 2023-12-25 NOTE — CARE UPDATE
Spoke with , PNC has been removed. Pt is in pain and has been taking oxycodone. Advised that she can continue to take tylenol 1 g Q8 as long as the oxycodone that is prescribe does not have tylenol or acetaminophen in the formula.

## 2023-12-26 DIAGNOSIS — S42.291A OTHER CLOSED DISPLACED FRACTURE OF PROXIMAL END OF RIGHT HUMERUS, INITIAL ENCOUNTER: Primary | ICD-10-CM
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
## 2023-12-27 ENCOUNTER — HOSPITAL ENCOUNTER (OUTPATIENT)
Dept: RADIOLOGY | Facility: HOSPITAL | Age: 68
Discharge: HOME | End: 2023-12-27
Attending: ORTHOPAEDIC SURGERY
Payer: COMMERCIAL

## 2023-12-27 ENCOUNTER — OFFICE VISIT (OUTPATIENT)
Dept: ORTHOPEDICS | Facility: CLINIC | Age: 68
End: 2023-12-27
Payer: COMMERCIAL

## 2023-12-27 DIAGNOSIS — Z96.611 HISTORY OF RIGHT SHOULDER REPLACEMENT: ICD-10-CM

## 2023-12-27 DIAGNOSIS — S42.291A OTHER CLOSED DISPLACED FRACTURE OF PROXIMAL END OF RIGHT HUMERUS, INITIAL ENCOUNTER: ICD-10-CM

## 2023-12-27 DIAGNOSIS — R11.0 NAUSEA: ICD-10-CM

## 2023-12-27 DIAGNOSIS — S42.291D OTHER CLOSED DISPLACED FRACTURE OF PROXIMAL END OF RIGHT HUMERUS WITH ROUTINE HEALING, SUBSEQUENT ENCOUNTER: Primary | ICD-10-CM

## 2023-12-27 DIAGNOSIS — R42 DIZZINESS: ICD-10-CM

## 2023-12-27 PROCEDURE — 73030 XR SHOULDER COMPLETE 2 OR MORE VIEWS RIGHT: ICD-10-PCS | Mod: 26,RT,, | Performed by: RADIOLOGY

## 2023-12-27 PROCEDURE — 3051F PR MOST RECENT HEMOGLOBIN A1C LEVEL 7.0 - < 8.0%: ICD-10-PCS | Mod: CPTII,S$GLB,, | Performed by: ORTHOPAEDIC SURGERY

## 2023-12-27 PROCEDURE — 73030 X-RAY EXAM OF SHOULDER: CPT | Mod: 26,RT,, | Performed by: RADIOLOGY

## 2023-12-27 PROCEDURE — 99999 PR PBB SHADOW E&M-EST. PATIENT-LVL III: ICD-10-PCS | Mod: PBBFAC,,, | Performed by: ORTHOPAEDIC SURGERY

## 2023-12-27 PROCEDURE — 1125F AMNT PAIN NOTED PAIN PRSNT: CPT | Mod: CPTII,S$GLB,, | Performed by: ORTHOPAEDIC SURGERY

## 2023-12-27 PROCEDURE — 3288F PR FALLS RISK ASSESSMENT DOCUMENTED: ICD-10-PCS | Mod: CPTII,S$GLB,, | Performed by: ORTHOPAEDIC SURGERY

## 2023-12-27 PROCEDURE — 3288F FALL RISK ASSESSMENT DOCD: CPT | Mod: CPTII,S$GLB,, | Performed by: ORTHOPAEDIC SURGERY

## 2023-12-27 PROCEDURE — 1101F PR PT FALLS ASSESS DOC 0-1 FALLS W/OUT INJ PAST YR: ICD-10-PCS | Mod: CPTII,S$GLB,, | Performed by: ORTHOPAEDIC SURGERY

## 2023-12-27 PROCEDURE — 4010F ACE/ARB THERAPY RXD/TAKEN: CPT | Mod: CPTII,S$GLB,, | Performed by: ORTHOPAEDIC SURGERY

## 2023-12-27 PROCEDURE — 1125F PR PAIN SEVERITY QUANTIFIED, PAIN PRESENT: ICD-10-PCS | Mod: CPTII,S$GLB,, | Performed by: ORTHOPAEDIC SURGERY

## 2023-12-27 PROCEDURE — 73030 X-RAY EXAM OF SHOULDER: CPT | Mod: TC,RT

## 2023-12-27 PROCEDURE — 4010F PR ACE/ARB THEARPY RXD/TAKEN: ICD-10-PCS | Mod: CPTII,S$GLB,, | Performed by: ORTHOPAEDIC SURGERY

## 2023-12-27 PROCEDURE — 3051F HG A1C>EQUAL 7.0%<8.0%: CPT | Mod: CPTII,S$GLB,, | Performed by: ORTHOPAEDIC SURGERY

## 2023-12-27 PROCEDURE — 99024 POSTOP FOLLOW-UP VISIT: CPT | Mod: S$GLB,,, | Performed by: ORTHOPAEDIC SURGERY

## 2023-12-27 PROCEDURE — 99999 PR PBB SHADOW E&M-EST. PATIENT-LVL III: CPT | Mod: PBBFAC,,, | Performed by: ORTHOPAEDIC SURGERY

## 2023-12-27 PROCEDURE — 1159F PR MEDICATION LIST DOCUMENTED IN MEDICAL RECORD: ICD-10-PCS | Mod: CPTII,S$GLB,, | Performed by: ORTHOPAEDIC SURGERY

## 2023-12-27 PROCEDURE — 1159F MED LIST DOCD IN RCRD: CPT | Mod: CPTII,S$GLB,, | Performed by: ORTHOPAEDIC SURGERY

## 2023-12-27 PROCEDURE — 99024 PR POST-OP FOLLOW-UP VISIT: ICD-10-PCS | Mod: S$GLB,,, | Performed by: ORTHOPAEDIC SURGERY

## 2023-12-27 PROCEDURE — 1101F PT FALLS ASSESS-DOCD LE1/YR: CPT | Mod: CPTII,S$GLB,, | Performed by: ORTHOPAEDIC SURGERY

## 2023-12-27 RX ORDER — ONDANSETRON 4 MG/1
4 TABLET, FILM COATED ORAL EVERY 8 HOURS PRN
Qty: 20 TABLET | Refills: 3 | Status: SHIPPED | OUTPATIENT
Start: 2023-12-27

## 2023-12-27 NOTE — PLAN OF CARE
[facility]  Discharge Final Note    Primary Care Provider: Pantera Burgos MD    Expected Discharge Date: 12/22/2023    Final Discharge Note (most recent)       Final Note - 12/27/23 1544          Final Note    Assessment Type Final Discharge Note     Anticipated Discharge Disposition Home or Self Care     What phone number can be called within the next 1-3 days to see how you are doing after discharge? --   [phone]                    Important Message from Medicare           Future Appointments   Date Time Provider Department Center   1/4/2024  9:30 AM Jesse Witt MD [facility] ORTHO [facility] [address]   2/1/2024  9:30 AM Jesse Witt MD [facility]TH[facility]     Patient discharged home to care of family on 12/22/23.    Yasmine Puentes RNCM  Case Management  [facility]  [phone]
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
## 2023-12-28 PROCEDURE — G0180 MD CERTIFICATION HHA PATIENT: HCPCS | Mod: ,,, | Performed by: ORTHOPAEDIC SURGERY

## 2024-01-03 DIAGNOSIS — S42.291D OTHER CLOSED DISPLACED FRACTURE OF PROXIMAL END OF RIGHT HUMERUS WITH ROUTINE HEALING, SUBSEQUENT ENCOUNTER: Primary | ICD-10-CM

## 2024-01-04 ENCOUNTER — OFFICE VISIT (OUTPATIENT)
Dept: ORTHOPEDICS | Facility: CLINIC | Age: 69
End: 2024-01-04
Payer: COMMERCIAL

## 2024-01-04 ENCOUNTER — HOSPITAL ENCOUNTER (OUTPATIENT)
Dept: RADIOLOGY | Facility: HOSPITAL | Age: 69
Discharge: HOME | End: 2024-01-04
Attending: ORTHOPAEDIC SURGERY
Payer: COMMERCIAL

## 2024-01-04 DIAGNOSIS — S42.291D OTHER CLOSED DISPLACED FRACTURE OF PROXIMAL END OF RIGHT HUMERUS WITH ROUTINE HEALING, SUBSEQUENT ENCOUNTER: ICD-10-CM

## 2024-01-04 DIAGNOSIS — Z96.611 HISTORY OF RIGHT SHOULDER REPLACEMENT: Primary | ICD-10-CM

## 2024-01-04 DIAGNOSIS — S42.291D OTHER CLOSED DISPLACED FRACTURE OF PROXIMAL END OF RIGHT HUMERUS WITH ROUTINE HEALING, SUBSEQUENT ENCOUNTER: Primary | ICD-10-CM

## 2024-01-04 PROCEDURE — 73030 X-RAY EXAM OF SHOULDER: CPT | Mod: 26,RT,, | Performed by: RADIOLOGY

## 2024-01-04 PROCEDURE — 99999 PR PBB SHADOW E&M-EST. PATIENT-LVL II: CPT | Mod: PBBFAC,,, | Performed by: ORTHOPAEDIC SURGERY

## 2024-01-04 PROCEDURE — 99024 POSTOP FOLLOW-UP VISIT: CPT | Mod: S$GLB,,, | Performed by: ORTHOPAEDIC SURGERY

## 2024-01-04 PROCEDURE — 73030 X-RAY EXAM OF SHOULDER: CPT | Mod: TC,RT

## 2024-01-04 RX ORDER — ACETAMINOPHEN 500 MG
1000 TABLET ORAL EVERY 8 HOURS
Qty: 60 TABLET | Refills: 0 | Status: SHIPPED | OUTPATIENT
Start: 2024-01-04
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
## 2024-01-25 DIAGNOSIS — S42.291D OTHER CLOSED DISPLACED FRACTURE OF PROXIMAL END OF RIGHT HUMERUS WITH ROUTINE HEALING, SUBSEQUENT ENCOUNTER: Primary | ICD-10-CM

## 2024-01-30 ENCOUNTER — OFFICE VISIT (OUTPATIENT)
Dept: ORTHOPEDICS | Facility: CLINIC | Age: 69
End: 2024-01-30
Payer: COMMERCIAL

## 2024-01-30 ENCOUNTER — HOSPITAL ENCOUNTER (OUTPATIENT)
Dept: RADIOLOGY | Facility: HOSPITAL | Age: 69
Discharge: HOME | End: 2024-01-30
Attending: ORTHOPAEDIC SURGERY
Payer: COMMERCIAL

## 2024-01-30 DIAGNOSIS — S42.291D OTHER CLOSED DISPLACED FRACTURE OF PROXIMAL END OF RIGHT HUMERUS WITH ROUTINE HEALING, SUBSEQUENT ENCOUNTER: ICD-10-CM

## 2024-01-30 DIAGNOSIS — Z96.611 HISTORY OF RIGHT SHOULDER REPLACEMENT: Primary | ICD-10-CM

## 2024-01-30 DIAGNOSIS — R60.0 EDEMA OF RIGHT UPPER ARM: ICD-10-CM

## 2024-01-30 PROCEDURE — 73030 X-RAY EXAM OF SHOULDER: CPT | Mod: TC,RT

## 2024-01-30 PROCEDURE — 1125F AMNT PAIN NOTED PAIN PRSNT: CPT | Mod: CPTII,S$GLB,, | Performed by: ORTHOPAEDIC SURGERY

## 2024-01-30 PROCEDURE — 1159F MED LIST DOCD IN RCRD: CPT | Mod: CPTII,S$GLB,, | Performed by: ORTHOPAEDIC SURGERY

## 2024-01-30 PROCEDURE — 73030 X-RAY EXAM OF SHOULDER: CPT | Mod: 26,RT,, | Performed by: RADIOLOGY

## 2024-01-30 PROCEDURE — 99024 POSTOP FOLLOW-UP VISIT: CPT | Mod: S$GLB,,, | Performed by: ORTHOPAEDIC SURGERY

## 2024-01-30 PROCEDURE — 99999 PR PBB SHADOW E&M-EST. PATIENT-LVL III: CPT | Mod: PBBFAC,,, | Performed by: ORTHOPAEDIC SURGERY

## 2024-01-30 RX ORDER — PREGABALIN 75 MG/1
75 CAPSULE ORAL 2 TIMES DAILY
Qty: 60 CAPSULE | Refills: 5 | Status: SHIPPED | OUTPATIENT
Start: 2024-01-30

## 2024-01-30 RX ORDER — METHOCARBAMOL 500 MG/1
500 TABLET, FILM COATED ORAL EVERY 6 HOURS PRN
Qty: 40 TABLET | Refills: 1 | Status: SHIPPED | OUTPATIENT
Start: 2024-01-30
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
## 2024-01-31 ENCOUNTER — DOCUMENT SCAN (OUTPATIENT)
Dept: HOME HEALTH SERVICES | Facility: HOSPITAL | Age: 69
End: 2024-01-31
Payer: COMMERCIAL

## 2024-02-20 ENCOUNTER — EXTERNAL HOME HEALTH (OUTPATIENT)
Dept: HOME HEALTH SERVICES | Facility: HOSPITAL | Age: 69
End: 2024-02-20
Payer: COMMERCIAL

## 2024-03-11 DIAGNOSIS — S42.291D OTHER CLOSED DISPLACED FRACTURE OF PROXIMAL END OF RIGHT HUMERUS WITH ROUTINE HEALING, SUBSEQUENT ENCOUNTER: Primary | ICD-10-CM

## 2024-03-12 ENCOUNTER — HOSPITAL ENCOUNTER (OUTPATIENT)
Dept: RADIOLOGY | Facility: HOSPITAL | Age: 69
Discharge: HOME | End: 2024-03-12
Attending: ORTHOPAEDIC SURGERY
Payer: COMMERCIAL

## 2024-03-12 ENCOUNTER — OFFICE VISIT (OUTPATIENT)
Dept: ORTHOPEDICS | Facility: CLINIC | Age: 69
End: 2024-03-12
Payer: COMMERCIAL

## 2024-03-12 VITALS — WEIGHT: 113 LBS | HEIGHT: 63 IN | BODY MASS INDEX: 20.02 KG/M2

## 2024-03-12 DIAGNOSIS — M25.641 FINGER STIFFNESS, RIGHT: ICD-10-CM

## 2024-03-12 DIAGNOSIS — Z96.611 HISTORY OF RIGHT SHOULDER REPLACEMENT: ICD-10-CM

## 2024-03-12 DIAGNOSIS — R60.0 EDEMA OF RIGHT UPPER ARM: ICD-10-CM

## 2024-03-12 DIAGNOSIS — S42.291D OTHER CLOSED DISPLACED FRACTURE OF PROXIMAL END OF RIGHT HUMERUS WITH ROUTINE HEALING, SUBSEQUENT ENCOUNTER: ICD-10-CM

## 2024-03-12 DIAGNOSIS — S42.291D OTHER CLOSED DISPLACED FRACTURE OF PROXIMAL END OF RIGHT HUMERUS WITH ROUTINE HEALING, SUBSEQUENT ENCOUNTER: Primary | ICD-10-CM

## 2024-03-12 PROCEDURE — 73030 X-RAY EXAM OF SHOULDER: CPT | Mod: TC,RT

## 2024-03-12 PROCEDURE — 99024 POSTOP FOLLOW-UP VISIT: CPT | Mod: S$GLB,,, | Performed by: ORTHOPAEDIC SURGERY

## 2024-03-12 PROCEDURE — 1125F AMNT PAIN NOTED PAIN PRSNT: CPT | Mod: CPTII,S$GLB,, | Performed by: ORTHOPAEDIC SURGERY

## 2024-03-12 PROCEDURE — 1159F MED LIST DOCD IN RCRD: CPT | Mod: CPTII,S$GLB,, | Performed by: ORTHOPAEDIC SURGERY

## 2024-03-12 PROCEDURE — 73030 X-RAY EXAM OF SHOULDER: CPT | Mod: 26,RT,, | Performed by: RADIOLOGY

## 2024-03-12 PROCEDURE — 99999 PR PBB SHADOW E&M-EST. PATIENT-LVL III: CPT | Mod: PBBFAC,,, | Performed by: ORTHOPAEDIC SURGERY
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
## 2024-03-13 ENCOUNTER — HOSPITAL ENCOUNTER (OUTPATIENT)
Dept: RADIOLOGY | Facility: HOSPITAL | Age: 69
Discharge: HOME | End: 2024-03-13
Attending: ORTHOPAEDIC SURGERY
Payer: COMMERCIAL

## 2024-03-13 DIAGNOSIS — R60.0 EDEMA OF RIGHT UPPER ARM: ICD-10-CM

## 2024-03-13 PROCEDURE — 93971 EXTREMITY STUDY: CPT | Mod: 26,RT,, | Performed by: RADIOLOGY

## 2024-03-13 PROCEDURE — 93971 EXTREMITY STUDY: CPT | Mod: TC,RT

## 2024-03-19 DIAGNOSIS — S42.291D OTHER CLOSED DISPLACED FRACTURE OF PROXIMAL END OF RIGHT HUMERUS WITH ROUTINE HEALING, SUBSEQUENT ENCOUNTER: Primary | ICD-10-CM

## 2024-03-29 ENCOUNTER — PATIENT MESSAGE (OUTPATIENT)
Dept: INTERNAL MEDICINE | Facility: CLINIC | Age: 69
End: 2024-03-29
Payer: COMMERCIAL

## 2024-04-08 ENCOUNTER — CLINICAL SUPPORT (OUTPATIENT)
Dept: REHABILITATION | Facility: OTHER | Age: 69
End: 2024-04-08
Attending: ORTHOPAEDIC SURGERY
Payer: COMMERCIAL

## 2024-04-08 DIAGNOSIS — S42.291D OTHER CLOSED DISPLACED FRACTURE OF PROXIMAL END OF RIGHT HUMERUS WITH ROUTINE HEALING, SUBSEQUENT ENCOUNTER: ICD-10-CM

## 2024-04-08 DIAGNOSIS — M79.641 PAIN IN RIGHT HAND: ICD-10-CM

## 2024-04-08 DIAGNOSIS — M25.641 FINGER STIFFNESS, RIGHT: ICD-10-CM

## 2024-04-08 DIAGNOSIS — Z96.611 HISTORY OF RIGHT SHOULDER REPLACEMENT: ICD-10-CM

## 2024-04-08 DIAGNOSIS — M25.641 STIFFNESS OF RIGHT HAND JOINT: Primary | ICD-10-CM

## 2024-04-08 PROCEDURE — 97530 THERAPEUTIC ACTIVITIES: CPT | Mod: PN | Performed by: OCCUPATIONAL THERAPIST

## 2024-04-08 PROCEDURE — 97165 OT EVAL LOW COMPLEX 30 MIN: CPT | Mod: PN | Performed by: OCCUPATIONAL THERAPIST

## 2024-04-08 NOTE — PLAN OF CARE
[facility]  Occupational Therapy Initial Evaluation     Name: Biankaharjinder Bernard  [facility] Number: [id number]    Therapy Diagnosis:   Encounter Diagnoses   Name Primary?    History of right shoulder replacement     Finger stiffness, right     Other closed displaced fracture of proximal end of right humerus with routine healing, subsequent encounter     Stiffness of right hand joint Yes    Pain in right hand      Physician: Jesse Witt    Physician Orders: Eval and Treat  Medical Diagnosis:   S42.291D (ICD-10-CM) - Other closed displaced fracture of proximal end of right humerus with routine healing, subsequent encounter     Surgical Procedure and Date: R rTSA, 12/20/23 / Date of Injury: 12/10/23  Evaluation Date: 4/8/2024  Insurance Authorization Period Expiration: 12/31/24  Plan of Care Certification Period: 7/5/24  Date of Return to MD: 4/17/24  Visit # / Visits authorized: 1 / 1  FOTO: 1/3    Precautions:  Standard and Diabetes    Time In: 11:30 am  Time Out: 12:15 pm  Total Appointment Time (timed & untimed codes): 45 minutes    Subjective     Date of Onset: 12/20/23    History of Current Condition/Mechanism of Injury: Pt underwent R rTSA on 12/20/23. She has been in physical therapy to address shoulder ROM. Pt c/o ongoing R hand stiffness since surgery.    Involved Side: Right  Dominant Side: Right    Mechanism of Injury: fall/surgery  Surgical Procedure: R rTSA  Imaging: see chart for imaging     Prior Therapy: currently in physical therapy for shoulder    Pain:  Functional Pain Scale Rating 0-10:   5/10 on average  4/10 at best  7/10 at worst  Location: fingers, especially MP joints  Description: Aching and occasional throbbing  Aggravating Factors: bending fingers  Easing Factors:  heat    Occupation:  work for [employer]   Working presently: employed  Duties: desk work    Functional Limitations/Social History:    Previous functional status includes: Independent with all  ADLs.     Current Functional Status   Home/Living environment: lives with their spouse      Limitation of Functional Status as follows:   ADLs/IADLs:     - Feeding: ind    - Bathing: ind    - Dressing/Grooming: ind    - Home Management: ind    - Driving: not driving yet     Leisure: Gardening, cooking, traveling, watching movies    Patient's Goals for Therapy: get back to using Right hand normally    Past Medical History/Physical Systems Review:   Bianka Bernard  has a past medical history of Cataract, Diabetes mellitus, type 2, and Hematuria.    Bianka Bernard  has a past surgical history that includes Breast cyst aspiration (Bilateral); Breast biopsy (Left); Stapedectomy (Right, 1/29/2019); Arthroplasty of shoulder (Right, 12/20/2023); and Rotator cuff repair (Right, 12/20/2023).    Bianka has a current medication list which includes the following prescription(s): acetaminophen, acetaminophen, atorvastatin, cholecalciferol (vitamin d3), empagliflozin, metformin, methocarbamol, ondansetron, oxycodone, oxycodone, pregabalin, and rybelsus, and the following Facility-Administered Medications: fentanyl and midazolam.    Review of patient's allergies indicates:   Allergen Reactions    Sulfa (sulfonamide antibiotics) Hives        Objective     Observation/Appearance: mild edema in R hand    Edema. Measured in centimeters.   4/8/2024 4/8/2024      Left Right     Wrist Crease 14.0 14.5     Distal Palmar Crease 17.1 17.9     IF P1 5.8 6.5     SF P1 5.0 5.5         Elbow and Wrist ROM. WNLs    Hand ROM. Measured in degrees.   4/8/2024 4/8/2024      Left Right            Index: MP   73                PIP      65                DIP  33            Long:  MP  70                PIP  61                DIP  43            Ring:   MP  65                PIP  55                DIP  28            Small:  MP  61                 PIP  46                 DIP  25               Strength (Dynamometer) and Pinch Strength (Pinch Gauge)  Measured in  pounds.   4/8/2024 4/8/2024      Left Right     Rung II 30,25,25 7,8,6     Average                Sensation: no numbness or tingling reported; not formally assessed    CMS Impairment/Limitation/Restriction for FOTO Hand Survey    Therapist reviewed FOTO scores for Marcus Bernard on 4/8/2024.   FOTO documents entered into [employer] - see Media section.    Limitation Score: 52%         Treatment     Total Treatment time (time-based codes) separate from Evaluation: 25 minutes    MARCUS received the following supervised modalities after being cleared for contradictions for 10 minutes:   -Paraffin to decrease pain and stiffness and increase tissue elasticity    MARCUS received the following manual therapy techniques for 5 minutes:   -retrograde massage    MARCUS received therapeutic activities for 10 minutes including:  -TGEs and blocking exercises  -issued compression glove to wear at night    Patient Education and Home Exercises      Education provided:   -role of OT, goals for OT, scheduling/cancellations, insurance limitations with patient.  -Additional Education provided: diagnosis and progression of treatment    Written Home Exercises Provided: yes.  Exercises were reviewed and MARCUS was able to demonstrate them prior to the end of the session.    MARCUS demonstrated good  understanding of the education provided.     Pt was advised to perform these exercises free of pain, and to stop performing them if pain occurs.    See EMR under Patient Instructions for exercises provided 4/8/2024.    Assessment     Marcus Bernard is a 68 y.o. female referred to outpatient occupational therapy and presents with a medical diagnosis of   S42.291D (ICD-10-CM) - Other closed displaced fracture of proximal end of right humerus with routine healing, subsequent encounter     .      Assessment of Occupational Performance   Performance Deficits    Physical:  Joint Mobility  Muscle Power/Strength  Edema   Strength  Fine Motor  Coordination  Pain    Cognitive:  No Deficits    Psychosocial:    No Deficits     Following medical record review it is determined that pt will benefit from occupational therapy services in order to maximize pain free and/or functional use of right hand. The following goals were discussed with the patient and patient is in agreement with them as to be addressed in the treatment plan. The patient's rehab potential is Excellent.     Anticipated barriers to occupational therapy: none    Plan of care discussed with patient: Yes  Patient's spiritual, cultural and educational needs considered and patient is agreeable to the plan of care and goals as stated below:     Medical Necessity is demonstrated by the following  Occupational Profile/History  Co-morbidities and personal factors that may impact the plan of care [x] LOW: Brief chart review  [] MODERATE: Expanded chart review   [] HIGH: Extensive chart review    Moderate / High Support Documentation:      Examination  Performance deficits relating to physical, cognitive or psychosocial skills that result in activity limitations and/or participation restrictions  [x] LOW: addressing 1-3 Performance deficits  [] MODERATE: 3-5 Performance deficits  [] HIGH: 5+ Performance deficits (please support below)    Moderate / High Support Documentation:      Treatment Options [x] LOW: Limited options  [] MODERATE: Several options  [] HIGH: Multiple options      Decision Making/ Complexity Score: low       Goals:   The following goals were discussed with the patient and patient is in agreement with them as to be addressed in the treatment plan.   Short term Goals:  1) Initiate HEP  2) Pt will increase AROM of R fingers by 5-10 degrees in order to assist with gripping by 4 weeks.  3) Pt will reduce edema by .5-1 cm in affected hand by 4 weeks.  4) Pt will reduce pain to less than 3/10 by 4 weeks.  5) Pt will increase functional  strength by 5# in order to A in opening containers  for med management or home management tasks by 4 weeks.   6) Patient will be able to achieve less than or equal to 43% on the FOTO, demonstrating overall improved functional ability with upper extremity. (Self-care category)    Long Term Goals:  Goals to be met by discharge:  1) Independent with HEP  2) Pt will increase R hand HOROWITZ to WNLs in order to increase functional use for grasp with home management or work related tasks by d/c.   3) Pt will decrease edema to trace or none to increase functional ROM by d/c.   4) Pt will decrease pain to trace or none while completing light home management tasks or work related tasks by d/c.   5) Patient will be able to achieve less than or equal to 36% on the FOTO, demonstrating overall improved functional ability with upper extremity.  (Self-care category)      Plan     Certification Period/Plan of care expiration: 4/8/2024 to 7/5/24.    Outpatient Occupational Therapy 2 times weekly for 8 weeks to include the following interventions: Paraffin, Fluidotherapy, Manual therapy/joint mobilizations, Therapeutic exercises/activities., Strengthening, and Edema Control.    Margaret Boasberg, OT

## 2024-04-10 DIAGNOSIS — S42.291D OTHER CLOSED DISPLACED FRACTURE OF PROXIMAL END OF RIGHT HUMERUS WITH ROUTINE HEALING, SUBSEQUENT ENCOUNTER: Primary | ICD-10-CM

## 2024-04-15 ENCOUNTER — CLINICAL SUPPORT (OUTPATIENT)
Dept: REHABILITATION | Facility: OTHER | Age: 69
End: 2024-04-15
Payer: COMMERCIAL

## 2024-04-15 DIAGNOSIS — M25.641 STIFFNESS OF RIGHT HAND JOINT: Primary | ICD-10-CM

## 2024-04-15 DIAGNOSIS — M79.641 PAIN IN RIGHT HAND: ICD-10-CM

## 2024-04-15 PROCEDURE — 97018 PARAFFIN BATH THERAPY: CPT | Mod: PN | Performed by: OCCUPATIONAL THERAPIST

## 2024-04-15 PROCEDURE — 97140 MANUAL THERAPY 1/> REGIONS: CPT | Mod: PN | Performed by: OCCUPATIONAL THERAPIST

## 2024-04-15 PROCEDURE — 97530 THERAPEUTIC ACTIVITIES: CPT | Mod: PN | Performed by: OCCUPATIONAL THERAPIST

## 2024-04-15 NOTE — PROGRESS NOTES
Occupational Therapy Treatment Note     Date: 4/15/2024  Name: Biankaharjinder Bernard  [facility] Number: [id number]    Therapy Diagnosis:   Encounter Diagnoses   Name Primary?    Stiffness of right hand joint Yes    Pain in right hand      Physician: Jesse Witt    Physician Orders: Eval and Treat  Medical Diagnosis:   S42.291D (ICD-10-CM) - Other closed displaced fracture of proximal end of right humerus with routine healing, subsequent encounter      Surgical Procedure and Date: R rTSA, 12/20/23 / Date of Injury: 12/10/23  Evaluation Date: 4/8/2024  Insurance Authorization Period Expiration: 12/31/24  Plan of Care Certification Period: 7/5/24  Date of Return to MD: 4/17/24  FOTO: 1/3    Visit # / Visits authorized: 2 / 20    Time In: 4:20 pm  Time Out: 5:00 pm  Total Billable Time: 40 minutes    Precautions:  Standard      Subjective     Pt reports: her fingers are bending a little more  she was compliant with home exercise program given last session.   Response to previous treatment:decreased pain and stiffness    Pain: 3/10  Location: right hands      OBJECTIVE     Edema. Measured in centimeters.    4/8/2024 4/8/2024         Left Right       Wrist Crease 14.0 14.5       Distal Palmar Crease 17.1 17.9       IF P1 5.8 6.5       SF P1 5.0 5.5             Elbow and Wrist ROM. WNLs     Hand ROM. Measured in degrees.    4/8/2024 4/8/2024         Left Right                   Index: MP    73                  PIP       65                  DIP   33                   Long:  MP   70                  PIP   61                  DIP   43                   Ring:   MP   65                  PIP   55                  DIP   28                   Small:  MP   61                   PIP   46                   DIP   25                       Strength (Dynamometer) and Pinch Strength (Pinch Gauge)  Measured in pounds.    4/8/2024 4/8/2024         Left Right       Rung II 30,25,25 7,8,6       Average                           Treatment      MARCUS received the following supervised modalities after being cleared for contradictions for 10 minutes:   -Paraffin to decrease pain and stiffness and increase tissue elasticity    MARCUS received the following manual therapy techniques for 10 minutes:   -IASTM and STM     MARCUS received therapeutic activities for 20 minutes including:  -TGES and blocking exercises  -[place]  with manipulation and translation x 1 container  -Yellow theraputty , dowel press and extension blooms  -Yellow flexbar smiles and frowns x 20  -Wrist 3 ways with 1# x 2 0    Home Exercises and Education Provided     Education provided:   - Continue HEP  - Progress towards goals     Written Home Exercises Provided: Patient instructed to cont prior HEP.  Exercises were reviewed and MARCUS was able to demonstrate them prior to the end of the session.  MARCUS demonstrated good  understanding of the HEP provided.   .   See EMR under Patient Instructions for exercises provided prior visit.        Assessment     Pt would continue to benefit from skilled OT. She arrived with reports of increased ROM. Pt performed all exercises without difficulty. Plan to progress as tolerated.     MARCUS is progressing well towards her goals and there are no updates to goals at this time. Pt prognosis is Excellent.     Pt will continue to benefit from skilled outpatient occupational therapy to address the deficits listed in the problem list on initial evaluation provide pt/family education and to maximize pt's level of independence in the home and community environment.     Anticipated barriers to occupational therapy: none    Pt's spiritual, cultural and educational needs considered and pt agreeable to plan of care and goals.    Goals:  The following goals were discussed with the patient and patient is in agreement with them as to be addressed in the treatment plan.   Short term Goals:  1) Initiate HEP  2) Pt will increase AROM of R fingers by 5-10 degrees in order to  assist with gripping by 4 weeks.  3) Pt will reduce edema by .5-1 cm in affected hand by 4 weeks.  4) Pt will reduce pain to less than 3/10 by 4 weeks.  5) Pt will increase functional  strength by 5# in order to A in opening containers for med management or home management tasks by 4 weeks.   6) Patient will be able to achieve less than or equal to 43% on the FOTO, demonstrating overall improved functional ability with upper extremity. (Self-care category)     Long Term Goals:  Goals to be met by discharge:  1) Independent with HEP  2) Pt will increase R hand HOROWITZ to WNLs in order to increase functional use for grasp with home management or work related tasks by d/c.   3) Pt will decrease edema to trace or none to increase functional ROM by d/c.   4) Pt will decrease pain to trace or none while completing light home management tasks or work related tasks by d/c.   5) Patient will be able to achieve less than or equal to 36% on the FOTO, demonstrating overall improved functional ability with upper extremity.  (Self-care category)    Plan   Continue with POC  Updates/Grading for next session: progress as tolerated      Margaret Boasberg, OT

## 2024-04-17 ENCOUNTER — PATIENT MESSAGE (OUTPATIENT)
Dept: INTERNAL MEDICINE | Facility: CLINIC | Age: 69
End: 2024-04-17
Payer: COMMERCIAL

## 2024-04-25 ENCOUNTER — OFFICE VISIT (OUTPATIENT)
Dept: ORTHOPEDICS | Facility: CLINIC | Age: 69
End: 2024-04-25
Payer: COMMERCIAL

## 2024-04-25 ENCOUNTER — CLINICAL SUPPORT (OUTPATIENT)
Dept: REHABILITATION | Facility: OTHER | Age: 69
End: 2024-04-25
Payer: COMMERCIAL

## 2024-04-25 ENCOUNTER — HOSPITAL ENCOUNTER (OUTPATIENT)
Dept: RADIOLOGY | Facility: HOSPITAL | Age: 69
Discharge: HOME | End: 2024-04-25
Attending: ORTHOPAEDIC SURGERY
Payer: COMMERCIAL

## 2024-04-25 DIAGNOSIS — M79.641 PAIN IN RIGHT HAND: ICD-10-CM

## 2024-04-25 DIAGNOSIS — Z96.611 HISTORY OF RIGHT SHOULDER REPLACEMENT: Primary | ICD-10-CM

## 2024-04-25 DIAGNOSIS — S42.291D OTHER CLOSED DISPLACED FRACTURE OF PROXIMAL END OF RIGHT HUMERUS WITH ROUTINE HEALING, SUBSEQUENT ENCOUNTER: ICD-10-CM

## 2024-04-25 DIAGNOSIS — M25.641 STIFFNESS OF RIGHT HAND JOINT: Primary | ICD-10-CM

## 2024-04-25 PROCEDURE — 99999 PR PBB SHADOW E&M-EST. PATIENT-LVL III: CPT | Mod: PBBFAC,,, | Performed by: ORTHOPAEDIC SURGERY

## 2024-04-25 PROCEDURE — 97018 PARAFFIN BATH THERAPY: CPT | Mod: PN,59 | Performed by: OCCUPATIONAL THERAPIST

## 2024-04-25 PROCEDURE — 97140 MANUAL THERAPY 1/> REGIONS: CPT | Mod: PN | Performed by: OCCUPATIONAL THERAPIST

## 2024-04-25 PROCEDURE — 97530 THERAPEUTIC ACTIVITIES: CPT | Mod: PN | Performed by: OCCUPATIONAL THERAPIST

## 2024-04-25 PROCEDURE — 73030 X-RAY EXAM OF SHOULDER: CPT | Mod: 26,RT,, | Performed by: RADIOLOGY

## 2024-04-25 PROCEDURE — 1125F AMNT PAIN NOTED PAIN PRSNT: CPT | Mod: CPTII,S$GLB,, | Performed by: ORTHOPAEDIC SURGERY

## 2024-04-25 PROCEDURE — 99213 OFFICE O/P EST LOW 20 MIN: CPT | Mod: S$GLB,,, | Performed by: ORTHOPAEDIC SURGERY

## 2024-04-25 PROCEDURE — 73030 X-RAY EXAM OF SHOULDER: CPT | Mod: TC,RT

## 2024-04-25 PROCEDURE — 1159F MED LIST DOCD IN RCRD: CPT | Mod: CPTII,S$GLB,, | Performed by: ORTHOPAEDIC SURGERY

## 2024-04-25 NOTE — PROGRESS NOTES
Occupational Therapy Treatment Note     Date: 4/25/2024  Name: Bianka Bernard  [facility] Number: [id number]    Therapy Diagnosis:   Encounter Diagnoses   Name Primary?    Stiffness of right hand joint Yes    Pain in right hand      Physician: Jesse Witt    Physician Orders: Eval and Treat  Medical Diagnosis:   S42.291D (ICD-10-CM) - Other closed displaced fracture of proximal end of right humerus with routine healing, subsequent encounter      Surgical Procedure and Date: R rTSA, 12/20/23 / Date of Injury: 12/10/23  Evaluation Date: 4/8/2024  Insurance Authorization Period Expiration: 12/31/24  Plan of Care Certification Period: 7/5/24  Date of Return to MD: 4/17/24  FOTO: 1/3    Visit # / Visits authorized: 3 / 20    Time In: 12:20 pm  Time Out: 1:05 pm  Total Billable Time: 45 minutes    Precautions:  Standard      Subjective     Pt reports: her fingers loosen up after exercise but then stiffen up again  she was compliant with home exercise program given last session.   Response to previous treatment:decreased pain and stiffness    Pain: 3/10  Location: right hand    OBJECTIVE     Edema. Measured in centimeters.    4/8/2024 4/8/2024         Left Right       Wrist Crease 14.0 14.5       Distal Palmar Crease 17.1 17.9       IF P1 5.8 6.5       SF P1 5.0 5.5             Elbow and Wrist ROM. WNLs     Hand ROM. Measured in degrees.    4/8/2024 4/8/2024         Left Right                   Index: MP    73                  PIP       65                  DIP   33                   Long:  MP   70                  PIP   61                  DIP   43                   Ring:   MP   65                  PIP   55                  DIP   28                   Small:  MP   61                   PIP   46                   DIP   25                       Strength (Dynamometer) and Pinch Strength (Pinch Gauge)  Measured in pounds.    4/8/2024 4/8/2024         Left Right       Rung II 30,25,25 7,8,6       Average                            Treatment     MARCUS received the following supervised modalities after being cleared for contradictions for 10 minutes:   -Paraffin to decrease pain and stiffness and increase tissue elasticity    MARCUS received the following manual therapy techniques for 10 minutes:   -IASTM and STM     MARCUS received therapeutic activities for 20 minutes including:  -TGES and blocking exercises  -Medium pom pom  with red clothespin x 2 containers  -Yellow theraputty dowel press, scrapes, and extension blooms  -Yellow flexbar smiles, frowns and twists x 20  -Wrist 3 ways with 1# x 20  -Yellow digi extender x 20  -Issued oval 8 splints (size 8) for triggering in IF and RF    Home Exercises and Education Provided     Education provided:   - Continue HEP  - Progress towards goals     Written Home Exercises Provided: Patient instructed to cont prior HEP.  Exercises were reviewed and MARCUS was able to demonstrate them prior to the end of the session.  MARCUS demonstrated good  understanding of the HEP provided.   .   See EMR under Patient Instructions for exercises provided prior visit.        Assessment     Pt would continue to benefit from skilled OT. Continued triggering noted in IF and RF. Issued oval 8 splints and instructed pt in wear and rationale. Pt performed all exercises without difficulty. Plan to progress as tolerated.     MARCUS is progressing well towards her goals and there are no updates to goals at this time. Pt prognosis is Excellent.     Pt will continue to benefit from skilled outpatient occupational therapy to address the deficits listed in the problem list on initial evaluation provide pt/family education and to maximize pt's level of independence in the home and community environment.     Anticipated barriers to occupational therapy: none    Pt's spiritual, cultural and educational needs considered and pt agreeable to plan of care and goals.    Goals:  The following goals were discussed with the patient and  patient is in agreement with them as to be addressed in the treatment plan.   Short term Goals:  1) Initiate HEP  2) Pt will increase AROM of R fingers by 5-10 degrees in order to assist with gripping by 4 weeks.  3) Pt will reduce edema by .5-1 cm in affected hand by 4 weeks.  4) Pt will reduce pain to less than 3/10 by 4 weeks.  5) Pt will increase functional  strength by 5# in order to A in opening containers for med management or home management tasks by 4 weeks.   6) Patient will be able to achieve less than or equal to 43% on the FOTO, demonstrating overall improved functional ability with upper extremity. (Self-care category)     Long Term Goals:  Goals to be met by discharge:  1) Independent with HEP  2) Pt will increase R hand HOROWITZ to WNLs in order to increase functional use for grasp with home management or work related tasks by d/c.   3) Pt will decrease edema to trace or none to increase functional ROM by d/c.   4) Pt will decrease pain to trace or none while completing light home management tasks or work related tasks by d/c.   5) Patient will be able to achieve less than or equal to 36% on the FOTO, demonstrating overall improved functional ability with upper extremity.  (Self-care category)    Plan   Continue with POC  Updates/Grading for next session: progress as tolerated      Margaret Boasberg, OT
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
## 2024-04-27 ENCOUNTER — CLINICAL SUPPORT (OUTPATIENT)
Dept: REHABILITATION | Facility: OTHER | Age: 69
End: 2024-04-27
Payer: COMMERCIAL

## 2024-04-27 DIAGNOSIS — M79.641 PAIN IN RIGHT HAND: ICD-10-CM

## 2024-04-27 DIAGNOSIS — M25.641 STIFFNESS OF RIGHT HAND JOINT: Primary | ICD-10-CM

## 2024-04-27 PROCEDURE — 97018 PARAFFIN BATH THERAPY: CPT | Mod: PN,59

## 2024-04-27 PROCEDURE — 97530 THERAPEUTIC ACTIVITIES: CPT | Mod: PN

## 2024-04-27 PROCEDURE — 97140 MANUAL THERAPY 1/> REGIONS: CPT | Mod: PN

## 2024-04-27 NOTE — PROGRESS NOTES
Occupational Therapy Treatment Note     Date: 4/27/2024  Name: Bianka Bernard  [facility] Number: [id number]    Therapy Diagnosis:   Encounter Diagnoses   Name Primary?    Stiffness of right hand joint Yes    Pain in right hand        Physician: Jesse Witt    Physician Orders: Eval and Treat  Medical Diagnosis:   S42.291D (ICD-10-CM) - Other closed displaced fracture of proximal end of right humerus with routine healing, subsequent encounter      Surgical Procedure and Date: R rTSA, 12/20/23 / Date of Injury: 12/10/23  Evaluation Date: 4/8/2024  Insurance Authorization Period Expiration: 12/31/24  Plan of Care Certification Period: 7/5/24  Date of Return to MD: 4/17/24  FOTO: 1/3    Visit # / Visits authorized: 3 / 20    Time In: 12:00 PM  Time Out: 1:00 PM  Total Billable Time: 60  minutes    Precautions:  Standard      Subjective     Pt reports: that she does her exercises 3-4x/day and she can make a fist after exercising but about 30 minutes after she does the exercises her hand gets stiff again. Patient reports that she is wearing her oval 8 splints on her fingers.   Response to previous treatment:decreased pain and stiffness    Pain: 2-3/10  Location: right hand    OBJECTIVE     Edema. Measured in centimeters.    4/8/2024 4/8/2024         Left Right       Wrist Crease 14.0 14.5       Distal Palmar Crease 17.1 17.9       IF P1 5.8 6.5       SF P1 5.0 5.5             Elbow and Wrist ROM. WNLs     Hand ROM. Measured in degrees.    4/8/2024 4/8/2024         Left Right                   Index: MP    73                  PIP       65                  DIP   33                   Long:  MP   70                  PIP   61                  DIP   43                   Ring:   MP   65                  PIP   55                  DIP   28                   Small:  MP   61                   PIP   46                   DIP   25                       Strength (Dynamometer) and Pinch Strength (Pinch Gauge)  Measured in  pounds.    4/8/2024 4/8/2024         Left Right       Rung II 30,25,25 7,8,6       Average                           Treatment     MARCUS received the following supervised modalities after being cleared for contradictions for 10 minutes:   -Paraffin to decrease pain and stiffness and increase tissue elasticity with moist heat     MARCUS received the following manual therapy techniques for 20 minutes:   -IASTM and STM   -Passive Range of Motion     MARCUS received therapeutic activities for 20 minutes including:  -TGES and blocking exercises  -Medium pom pom  with red clothespin x 2 containers  -Yellow theraputty dowel press, scrapes, and extension blooms  -Yellow flexbar smiles, frowns and twists x 20  -Wrist 3 ways with 1# x 20  -Yellow digi extender x 20      Home Exercises and Education Provided     Education provided:   - Continue HEP  - Progress towards goals     Written Home Exercises Provided: Patient instructed to cont prior HEP.  Exercises were reviewed and MARCUS was able to demonstrate them prior to the end of the session.  MARCUS demonstrated good  understanding of the HEP provided.   .   See EMR under Patient Instructions for exercises provided prior visit.        Assessment     Pt would continue to benefit from skilled OT. Pt performed all exercises without difficulty. Plan to progress as tolerated.     MARCUS is progressing well towards her goals and there are no updates to goals at this time. Pt prognosis is Excellent.     Pt will continue to benefit from skilled outpatient occupational therapy to address the deficits listed in the problem list on initial evaluation provide pt/family education and to maximize pt's level of independence in the home and community environment.     Anticipated barriers to occupational therapy: none    Pt's spiritual, cultural and educational needs considered and pt agreeable to plan of care and goals.    Goals:  The following goals were discussed with the patient and patient is in  agreement with them as to be addressed in the treatment plan.   Short term Goals:  1) Initiate HEP  2) Pt will increase AROM of R fingers by 5-10 degrees in order to assist with gripping by 4 weeks.  3) Pt will reduce edema by .5-1 cm in affected hand by 4 weeks.  4) Pt will reduce pain to less than 3/10 by 4 weeks.  5) Pt will increase functional  strength by 5# in order to A in opening containers for med management or home management tasks by 4 weeks.   6) Patient will be able to achieve less than or equal to 43% on the FOTO, demonstrating overall improved functional ability with upper extremity. (Self-care category)     Long Term Goals:  Goals to be met by discharge:  1) Independent with HEP  2) Pt will increase R hand HOROWITZ to WNLs in order to increase functional use for grasp with home management or work related tasks by d/c.   3) Pt will decrease edema to trace or none to increase functional ROM by d/c.   4) Pt will decrease pain to trace or none while completing light home management tasks or work related tasks by d/c.   5) Patient will be able to achieve less than or equal to 36% on the FOTO, demonstrating overall improved functional ability with upper extremity.  (Self-care category)    Plan   Continue with POC  Updates/Grading for next session: progress as tolerated      Bing Emily Niño OT

## 2024-05-01 ENCOUNTER — CLINICAL SUPPORT (OUTPATIENT)
Dept: REHABILITATION | Facility: OTHER | Age: 69
End: 2024-05-01
Payer: COMMERCIAL

## 2024-05-01 DIAGNOSIS — M79.641 PAIN IN RIGHT HAND: ICD-10-CM

## 2024-05-01 DIAGNOSIS — M25.641 STIFFNESS OF RIGHT HAND JOINT: Primary | ICD-10-CM

## 2024-05-01 PROCEDURE — 97140 MANUAL THERAPY 1/> REGIONS: CPT | Mod: PN

## 2024-05-01 PROCEDURE — 97530 THERAPEUTIC ACTIVITIES: CPT | Mod: PN

## 2024-05-01 PROCEDURE — 97018 PARAFFIN BATH THERAPY: CPT | Mod: PN

## 2024-05-01 NOTE — PATIENT INSTRUCTIONS
[facility], OCCUPATIONAL THERAPY  HOME EXERCISE PROGRAM     Complete the following exercises for 10 repetitions each, 3-5x/day:       AROM Full Fist with Wedges  - Place the wedges between your fingers and make a fist. Hold 3 seconds.   - Then straighten your fingers, pointing towards the ceiling.          AROM: Abduction / Adduction  - Place wedges between the fingers  - With hand flat on table, squeeze the fingers together into the wedges      Flexion Glove  - Wear the glove for upto 30 min, 3x/day - this should be a comfortable stretch!     Therapist: Maribel Azul, CALI/L     Copyright © [facility]. All rights reserved.

## 2024-05-01 NOTE — PROGRESS NOTES
Occupational Therapy Treatment Note     Date: 5/1/2024  Name: Bianka Bernard  [facility] Number: [id number]    Therapy Diagnosis:   Encounter Diagnoses   Name Primary?    Stiffness of right hand joint Yes    Pain in right hand        Physician: Jesse Witt    Physician Orders: Eval and Treat  Medical Diagnosis:   S42.291D (ICD-10-CM) - Other closed displaced fracture of proximal end of right humerus with routine healing, subsequent encounter      Surgical Procedure and Date: R rTSA, 12/20/23 / Date of Injury: 12/10/23  Evaluation Date: 4/8/2024  Insurance Authorization Period Expiration: 12/31/24  Plan of Care Certification Period: 7/5/24  Date of Return to MD: 4/17/24  FOTO: 1/3    Visit # / Visits authorized: 5 / 20    Time In: 11:00 AM  Time Out: 12:00 PM  Total Billable Time: 60  minutes    Precautions:  Standard      Subjective     Pt reports: she has been wearing oval 8 and noting improvement in triggering but still locks at times, after exercises/heat the hand loosens up but that quickly stiffens again  Response to previous treatment:decreased pain and stiffness    Pain: 2-3/10  Location: right hand    OBJECTIVE     Edema. Measured in centimeters.    4/8/2024 4/8/2024         Left Right       Wrist Crease 14.0 14.5       Distal Palmar Crease 17.1 17.9       IF P1 5.8 6.5       SF P1 5.0 5.5             Elbow and Wrist ROM. WNLs     Hand ROM. Measured in degrees.    4/8/2024 5/1/24       Right  Right               Index: MP  73  83                PIP     65  68                DIP 33  32               Long:  MP 70 78                 PIP 61  66                DIP 43  40               Ring:   MP 65  75                PIP 55  65                DIP 28  30               Small:  MP 61  60                 PIP 46  53                 DIP 25  35                   Strength (Dynamometer) and Pinch Strength (Pinch Gauge)  Measured in pounds.    4/8/2024 4/8/2024 5/1/24        Left Right Right     Rung II 30,25,25  7,8,6  15, 15, 15     Average      15 lb                     Treatment     MARCUS received the following supervised modalities after being cleared for contradictions for 10 minutes:   -Paraffin to decrease pain and stiffness and increase tissue elasticity with moist heat       MARCUS received the following manual therapy techniques for 15 minutes:   -IASTM about dorsal hand, digits and palm       MARCUS received therapeutic activities for 35 minutes including:  -Reassessment of objective measurements  - Applied KT donut hole strip about IF and RF for dorsal MP correction, EDF strip on digits 2-5  - Hook fist to full fist (10 reps)  - Full fist with wedges (20 reps)  - Digit adduction with wedges (30 reps)  - Provided with flexion glove and demonstrated donning/doffing, adjustment of rubberbands and traction, instructed to wear 3x/day for upto 30 minutes for a low load prolonged stretch. Provided with additional rubberbands for replacement    Not performed today:  -Medium pom pom  with red clothespin x 2 containers  -Yellow theraputty dowel press, scrapes, and extension blooms  -Yellow flexbar smiles, frowns and twists x 20  -Wrist 3 ways with 1# x 20  -Yellow digi extender x 20      Home Exercises and Education Provided     Education provided:   - Continue HEP  - Progress towards goals     Written Home Exercises Provided: Patient instructed to cont prior HEP.  Exercises were reviewed and MARCUS was able to demonstrate them prior to the end of the session.  MARCUS demonstrated good  understanding of the HEP provided.   .   See EMR under Patient Instructions for exercises provided prior visit.        Assessment     Marcus tolerated treatment tasks well - presents with continued significant limited ROM into flexion of all digits, although improved from initial eval and much improved by end of session as observed by ability to touch palm. She has met 3/6 STG. Updated HEP with tendon glides involving wedges and with flexion glove  for LLPS into flexion. She demonstrates independence with HEP and will follow up as needed, due to time commitment with PT for the shoulder.    MARCUS is progressing well towards her goals and there are no updates to goals at this time. Pt prognosis is Excellent.     Pt will continue to benefit from skilled outpatient occupational therapy to address the deficits listed in the problem list on initial evaluation provide pt/family education and to maximize pt's level of independence in the home and community environment.     Anticipated barriers to occupational therapy: none    Pt's spiritual, cultural and educational needs considered and pt agreeable to plan of care and goals.    Goals:  The following goals were discussed with the patient and patient is in agreement with them as to be addressed in the treatment plan.   Short term Goals:  1) Initiate HEP - met  2) Pt will increase AROM of R fingers by 5-10 degrees in order to assist with gripping by 4 weeks. - met  3) Pt will reduce edema by .5-1 cm in affected hand by 4 weeks.  4) Pt will reduce pain to less than 3/10 by 4 weeks.  5) Pt will increase functional  strength by 5# in order to A in opening containers for med management or home management tasks by 4 weeks. - met  6) Patient will be able to achieve less than or equal to 43% on the FOTO, demonstrating overall improved functional ability with upper extremity. (Self-care category)     Long Term Goals:  Goals to be met by discharge:  1) Independent with HEP  2) Pt will increase R hand HOROWITZ to WNLs in order to increase functional use for grasp with home management or work related tasks by d/c.   3) Pt will decrease edema to trace or none to increase functional ROM by d/c.   4) Pt will decrease pain to trace or none while completing light home management tasks or work related tasks by d/c.   5) Patient will be able to achieve less than or equal to 36% on the FOTO, demonstrating overall improved functional ability  with upper extremity.  (Self-care category)    Plan   Continue with POC  Updates/Grading for next session: progress as tolerated      Maribel Azul OT
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
## 2024-07-25 DIAGNOSIS — S42.291D OTHER CLOSED DISPLACED FRACTURE OF PROXIMAL END OF RIGHT HUMERUS WITH ROUTINE HEALING, SUBSEQUENT ENCOUNTER: Primary | ICD-10-CM

## 2024-07-30 ENCOUNTER — HOSPITAL ENCOUNTER (OUTPATIENT)
Dept: RADIOLOGY | Facility: HOSPITAL | Age: 69
Discharge: HOME | End: 2024-07-30
Attending: ORTHOPAEDIC SURGERY
Payer: COMMERCIAL

## 2024-07-30 ENCOUNTER — OFFICE VISIT (OUTPATIENT)
Dept: ORTHOPEDICS | Facility: CLINIC | Age: 69
End: 2024-07-30
Payer: COMMERCIAL

## 2024-07-30 ENCOUNTER — TELEPHONE (OUTPATIENT)
Dept: ORTHOPEDICS | Facility: CLINIC | Age: 69
End: 2024-07-30
Payer: COMMERCIAL

## 2024-07-30 DIAGNOSIS — Z96.611 HISTORY OF RIGHT SHOULDER REPLACEMENT: ICD-10-CM

## 2024-07-30 DIAGNOSIS — S42.291D OTHER CLOSED DISPLACED FRACTURE OF PROXIMAL END OF RIGHT HUMERUS WITH ROUTINE HEALING, SUBSEQUENT ENCOUNTER: Primary | ICD-10-CM

## 2024-07-30 DIAGNOSIS — M65.331 TRIGGER MIDDLE FINGER OF RIGHT HAND: ICD-10-CM

## 2024-07-30 DIAGNOSIS — M65.321 TRIGGER INDEX FINGER OF RIGHT HAND: ICD-10-CM

## 2024-07-30 DIAGNOSIS — M65.341 TRIGGER RING FINGER OF RIGHT HAND: ICD-10-CM

## 2024-07-30 DIAGNOSIS — S42.291D OTHER CLOSED DISPLACED FRACTURE OF PROXIMAL END OF RIGHT HUMERUS WITH ROUTINE HEALING, SUBSEQUENT ENCOUNTER: ICD-10-CM

## 2024-07-30 PROCEDURE — 73030 X-RAY EXAM OF SHOULDER: CPT | Mod: TC,RT

## 2024-07-30 PROCEDURE — 1159F MED LIST DOCD IN RCRD: CPT | Mod: CPTII,S$GLB,, | Performed by: ORTHOPAEDIC SURGERY

## 2024-07-30 PROCEDURE — 1125F AMNT PAIN NOTED PAIN PRSNT: CPT | Mod: CPTII,S$GLB,, | Performed by: ORTHOPAEDIC SURGERY

## 2024-07-30 PROCEDURE — 73030 X-RAY EXAM OF SHOULDER: CPT | Mod: 26,RT,, | Performed by: RADIOLOGY

## 2024-07-30 PROCEDURE — 3051F HG A1C>EQUAL 7.0%<8.0%: CPT | Mod: CPTII,S$GLB,, | Performed by: ORTHOPAEDIC SURGERY

## 2024-07-30 PROCEDURE — 99214 OFFICE O/P EST MOD 30 MIN: CPT | Mod: S$GLB,,, | Performed by: ORTHOPAEDIC SURGERY

## 2024-07-30 PROCEDURE — 99999 PR PBB SHADOW E&M-EST. PATIENT-LVL III: CPT | Mod: PBBFAC,,, | Performed by: ORTHOPAEDIC SURGERY
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
## 2024-07-30 NOTE — TELEPHONE ENCOUNTER
LVM for pt to schedule     Lexi Haile MS, OTC  Clinical & OR Assistant to Dr. Mark Munoz  [facility]  [phone]      ----- Message from Rach Gudino MA sent at 7/30/2024  3:16 PM CDT -----  Hey I think she just having some hand pain so not too urgent. He operated on her few months ago I believe its the same side her shoulder sx was.  ----- Message -----  From: Lexi Haile  Sent: 7/30/2024   9:29 AM CDT  To: Rach Gudino MA    Hi!    For what and how urgently?  ----- Message -----  From: Rach Gudino MA  Sent: 7/30/2024   9:24 AM CDT  To: Lexi Haile    Good morning, can we get this pt in to see Munoz per sugalski?

## 2024-08-22 ENCOUNTER — PATIENT MESSAGE (OUTPATIENT)
Dept: ORTHOPEDICS | Facility: CLINIC | Age: 69
End: 2024-08-22
Payer: COMMERCIAL

## 2024-08-22 DIAGNOSIS — M79.641 RIGHT HAND PAIN: Primary | ICD-10-CM

## 2024-08-26 ENCOUNTER — HOSPITAL ENCOUNTER (OUTPATIENT)
Dept: RADIOLOGY | Facility: OTHER | Age: 69
Discharge: HOME | End: 2024-08-26
Attending: ORTHOPAEDIC SURGERY
Payer: COMMERCIAL

## 2024-08-26 ENCOUNTER — OFFICE VISIT (OUTPATIENT)
Dept: ORTHOPEDICS | Facility: CLINIC | Age: 69
End: 2024-08-26
Payer: COMMERCIAL

## 2024-08-26 ENCOUNTER — PATIENT MESSAGE (OUTPATIENT)
Dept: ORTHOPEDICS | Facility: CLINIC | Age: 69
End: 2024-08-26
Payer: COMMERCIAL

## 2024-08-26 DIAGNOSIS — M65.341 TRIGGER RING FINGER OF RIGHT HAND: ICD-10-CM

## 2024-08-26 DIAGNOSIS — M65.341 TRIGGER FINGER, RIGHT RING FINGER: ICD-10-CM

## 2024-08-26 DIAGNOSIS — M79.641 RIGHT HAND PAIN: ICD-10-CM

## 2024-08-26 DIAGNOSIS — M65.321 TRIGGER FINGER, RIGHT INDEX FINGER: Primary | ICD-10-CM

## 2024-08-26 DIAGNOSIS — M65.321 TRIGGER INDEX FINGER OF RIGHT HAND: ICD-10-CM

## 2024-08-26 DIAGNOSIS — M65.331 TRIGGER MIDDLE FINGER OF RIGHT HAND: ICD-10-CM

## 2024-08-26 PROCEDURE — 3051F HG A1C>EQUAL 7.0%<8.0%: CPT | Mod: CPTII,S$GLB,, | Performed by: ORTHOPAEDIC SURGERY

## 2024-08-26 PROCEDURE — 1126F AMNT PAIN NOTED NONE PRSNT: CPT | Mod: CPTII,S$GLB,, | Performed by: ORTHOPAEDIC SURGERY

## 2024-08-26 PROCEDURE — 73130 X-RAY EXAM OF HAND: CPT | Mod: 26,RT,, | Performed by: RADIOLOGY

## 2024-08-26 PROCEDURE — 73130 X-RAY EXAM OF HAND: CPT | Mod: TC,FY,RT

## 2024-08-26 PROCEDURE — 3288F FALL RISK ASSESSMENT DOCD: CPT | Mod: CPTII,S$GLB,, | Performed by: ORTHOPAEDIC SURGERY

## 2024-08-26 PROCEDURE — 99999 PR PBB SHADOW E&M-EST. PATIENT-LVL III: CPT | Mod: PBBFAC,,, | Performed by: ORTHOPAEDIC SURGERY

## 2024-08-26 PROCEDURE — 20550 NJX 1 TENDON SHEATH/LIGAMENT: CPT | Mod: RT,S$GLB,, | Performed by: ORTHOPAEDIC SURGERY

## 2024-08-26 PROCEDURE — 1159F MED LIST DOCD IN RCRD: CPT | Mod: CPTII,S$GLB,, | Performed by: ORTHOPAEDIC SURGERY

## 2024-08-26 PROCEDURE — 99203 OFFICE O/P NEW LOW 30 MIN: CPT | Mod: 25,S$GLB,, | Performed by: ORTHOPAEDIC SURGERY

## 2024-08-26 PROCEDURE — 1101F PT FALLS ASSESS-DOCD LE1/YR: CPT | Mod: CPTII,S$GLB,, | Performed by: ORTHOPAEDIC SURGERY

## 2024-08-26 RX ORDER — DEXAMETHASONE SODIUM PHOSPHATE 4 MG/ML
4 INJECTION, SOLUTION INTRA-ARTICULAR; INTRALESIONAL; INTRAMUSCULAR; INTRAVENOUS; SOFT TISSUE
Status: DISCONTINUED | OUTPATIENT
Start: 2024-08-26 | End: 2024-08-26 | Stop reason: HOSPADM

## 2024-08-26 RX ADMIN — DEXAMETHASONE SODIUM PHOSPHATE 4 MG: 4 INJECTION, SOLUTION INTRA-ARTICULAR; INTRALESIONAL; INTRAMUSCULAR; INTRAVENOUS; SOFT TISSUE at 08:08

## 2024-08-26 NOTE — PROCEDURES
Tendon Sheath    Date/Time: 8/26/2024 8:00 AM    Performed by: Mark Munoz MD  Authorized by: Mark Munoz MD    Consent Done?:  Yes (Verbal)  Indications:  Pain  Site marked: the procedure site was marked    Timeout: prior to procedure the correct patient, procedure, and site was verified    Prep: patient was prepped and draped in usual sterile fashion      Local anesthesia used?: Yes    Local anesthetic:  Topical anesthetic (Topical spray prior to injection and 1cc 1% plain lidocaine in the injectate)  Location:  Ring finger  Site:  R ring flexor tendon sheath  Ultrasonic guidance for needle placement?: No    Needle size:  25 G  Approach:  Volar  Medications:  4 mg dexAMETHasone 4 mg/mL  Patient tolerance:  Patient tolerated the procedure well with no immediate complications

## 2024-08-26 NOTE — PROCEDURES
Tendon Sheath    Date/Time: 8/26/2024 8:00 AM    Performed by: Mark Munoz MD  Authorized by: Mark Munoz MD    Consent Done?:  Yes (Verbal)  Indications:  Pain  Site marked: the procedure site was marked    Timeout: prior to procedure the correct patient, procedure, and site was verified    Prep: patient was prepped and draped in usual sterile fashion      Local anesthesia used?: Yes    Local anesthetic:  Topical anesthetic (Topical spray prior to injection and 1cc 1% plain lidocaine in the injectate)  Location:  Index finger  Site:  R index flexor tendon sheath  Ultrasonic guidance for needle placement?: No    Needle size:  25 G  Approach:  Volar  Medications:  4 mg dexAMETHasone 4 mg/mL  Patient tolerance:  Patient tolerated the procedure well with no immediate complications

## 2024-08-26 NOTE — PROGRESS NOTES
[facility]  History & Physical  Orthopedics    SUBJECTIVE:      Chief Complaint: Right Hand Locking    Referring Provider: Jesse Witt     History of Present Illness:  Patient is a 68 y.o. right hand dominant female who presents today with complaints of right hand triggering fingers since about 8 months ago. She denies recent trauma or an inciting event. The patient reports the pain is predominately located in right index finger and right ring finger. Her right ring finger is the most bothersome. She reports pain is aggravated by activities with her hand and cooking. She reports pain is alleviated by inactivity. Patient reports previous treatment includes activity modifications, rest, physical therapy, and occupational therapy  Patient denies weakness, numbness, and tingling. She reports she had a right sided shoulder surgery with Dr. Ramos 12/20/24. She presents today for initial evaluation with no further complaints.     Onset of symptoms/DOI was about 8 months ago.    Symptoms are aggravated by activity, movement, and cooking .    Symptoms are alleviated by rest and inactivity .    Symptoms consist of pain, swelling, and locking of the ring finger and index .    The patient rates their pain as a 0/10.    Attempted treatment(s) and/or interventions include activity modifications, rest, PT, and OT.     The patient denies any fevers, chills, N/V, D/C and presents for evaluation.       Past Medical History:   Diagnosis Date    Cataract     Diabetes mellitus, type 2     Hematuria     Long hx of this.  Being followed by Nephrologist, Dr. Burgos at [facility].     Past Surgical History:   Procedure Laterality Date    ARTHROPLASTY OF SHOULDER Right 12/20/2023    Procedure: ARTHROPLASTY, SHOULDER;  Surgeon: Jesse Witt MD;  Location: [facility] OR [address] [place] [address];  Service: Orthopedics;  Laterality: Right;    BREAST BIOPSY Left     core    BREAST CYST ASPIRATION Bilateral     ROTATOR CUFF  REPAIR Right 12/20/2023    Procedure: REPAIR, ROTATOR CUFF;  Surgeon: Jesse Witt MD;  Location: [facility] OR [address] [place] [address];  Service: Orthopedics;  Laterality: Right;    STAPEDECTOMY Right 1/29/2019    Procedure: STAPEDECTOMY;  Surgeon: Luis Solorio MD;  Location: [facility] OR [address] [place] [address];  Service: ENT;  Laterality: Right;     Review of patient's allergies indicates:   Allergen Reactions    Sulfa (sulfonamide antibiotics) Hives     Social History     Social History Narrative    Not on file     Family History   Problem Relation Name Age of Onset    Hypertension Mother      Cataracts Mother      Hypertension Father      Deep vein thrombosis Father      Hypertension Sister           Current Outpatient Medications:     acetaminophen (TYLENOL) 500 MG tablet, Take 1 tablet (500 mg total) by mouth every 6 (six) hours as needed for Pain., Disp: 90 tablet, Rfl: 0    acetaminophen (TYLENOL) 500 MG tablet, Take 2 tablets (1,000 mg total) by mouth every 8 (eight) hours., Disp: 60 tablet, Rfl: 0    atorvastatin (LIPITOR) 20 MG tablet, Take 20 mg by mouth once daily., Disp: , Rfl: 3    cholecalciferol, vitamin D3, (VITAMIN D3) 1,000 unit capsule, Take 1,000 Units by mouth every morning., Disp: , Rfl:     empagliflozin (JARDIANCE) 10 mg tablet, Take 10 mg by mouth every evening., Disp: , Rfl:     metFORMIN (GLUCOPHAGE) 1000 MG tablet, Take 1 tablet (1,000 mg total) by mouth 2 (two) times daily with meals., Disp: 90 tablet, Rfl: 3    methocarbamoL (ROBAXIN) 500 MG Tab, Take 1 tablet (500 mg total) by mouth every 6 (six) hours as needed (pain, muscle spasm). (Patient not taking: Reported on 7/30/2024), Disp: 40 tablet, Rfl: 1    ondansetron (ZOFRAN) 4 MG tablet, Take 1 tablet (4 mg total) by mouth every 8 (eight) hours as needed for Nausea., Disp: 20 tablet, Rfl: 3    oxyCODONE (ROXICODONE) 5 MG immediate release tablet, Take 1 tablet (5 mg total) by mouth every 4 (four) hours as needed for Pain. (Patient not taking: Reported on  7/30/2024), Disp: 42 tablet, Rfl: 0    oxyCODONE (ROXICODONE) 5 MG immediate release tablet, Take 1 tablet (5 mg total) by mouth every 6 (six) hours as needed for Pain. May take 1-2 tablets every 6 hours as needed for pain (Patient not taking: Reported on 7/30/2024), Disp: 42 tablet, Rfl: 0    pregabalin (LYRICA) 75 MG capsule, Take 1 capsule (75 mg total) by mouth 2 (two) times daily., Disp: 60 capsule, Rfl: 5    RYBELSUS 3 mg tablet, Take 3 mg by mouth every morning., Disp: , Rfl:   No current facility-administered medications for this visit.    Facility-Administered Medications Ordered in Other Visits:     fentaNYL 50 mcg/mL injection  mcg,  mcg, Intravenous, Q5 Min PRN, Kwaku Frazier MD, 50 mcg at 12/20/23 0755    midazolam (VERSED) 1 mg/mL injection 0.5-4 mg, 0.5-4 mg, Intravenous, PRN, Kwaku Frazier MD, 2 mg at 12/20/23 0755    Review of Systems:  Constitutional: no fever or chills  Eyes: no visual changes  ENT: no nasal congestion or sore throat  Respiratory: no cough or shortness of breath  Cardiovascular: no chest pain  Gastrointestinal: no nausea or vomiting, tolerating diet  Musculoskeletal: pain, soreness, and locking    OBJECTIVE:      Vital Signs (Most Recent):  There were no vitals filed for this visit.  There is no height or weight on file to calculate BMI.      Physical Exam:  Constitutional: The patient appears well-developed and well-nourished. No distress.   Skin: No lesions appreciated  Head: Normocephalic and atraumatic.   Nose: Nose normal.   Ears: No deformities seen  Eyes: Conjunctivae and EOM are normal.   Neck: No tracheal deviation present.   Cardiovascular: Normal rate and intact distal pulses.    Pulmonary/Chest: Effort normal. No respiratory distress.   Abdominal: There is no guarding.   Neurological: The patient is alert.   Psychiatric: The patient has a normal mood and affect.     Right Hand/Wrist Examination:    Observation/Inspection:  Swelling  Mild edema noted  to the right hand   Deformity  none  Discoloration  none     Scars   none    Atrophy  none    HAND/WRIST EXAMINATION:  TTP to the right ring finger A1 pulley   NTTP to the right index finger A1 Pulley   Palpable cords along the Right Ring Finger volar surface      Neurovascular Exam:  Digits WWP, brisk CR < 3s throughout  NVI motor/LTS to M/R/U nerves, radial pulse 2+    ROM hand full, decreased flexion and extension of the right ring triggering finger at PIP/DIP. Positive for vazquez trigger at the right ring finger. Negative for vazquez trigger of the right index finger.    ROM wrist full, painless    ROM elbow full, painless    Abdomen not guarded  Respirations nonlabored  Perfusion intact    Diagnostic Results:     Imaging - I independently viewed the patient's imaging as well as the radiology report.  Xrays of the patient's right hand  demonstrate no evidence of any acute fractures or dislocations. Mild arthritis especially in the DIPJs.     EMG- none    ASSESSMENT/PLAN:      68 y.o. yo female with Right Index and Right Ring Trigger Fingers     Plan: The patient and I had a thorough discussion today.  We discussed the working diagnosis as well as several other potential alternative diagnoses.  Treatment options were discussed, both conservative and surgical.  Conservative treatment options would include things such as activity modifications, workplace modifications, a period of rest, oral vs topical OTC and prescription anti-inflammatory medications, occupational therapy, splinting/bracing, immobilization, corticosteroid injections, and others.  Surgical options were discussed as well.     At this time, the patient would like to proceed with a trial of trigger finger steroid injections. Corticosteroid injection performed today without issues. We discuss trigger finger splint at night, NSAIDs/voltaren gel massage and heat. The patient will follow up in 6 weeks for re-evaluation. Upon re-evaliat    Should the  patient's symptoms worsen, persist, or fail to improve they should return for reevaluation and I would be happy to see them back anytime.    Please do not hesitate to reach out to us via email, phone, or MyChart with any questions, concerns, or feedback.

## (undated) DEVICE — BLADE SAG 18.0X1.27X100

## (undated) DEVICE — SUT FIBERTAPE 1.3MM TAILS

## (undated) DEVICE — ELECTRODE REM PLYHSV RETURN 9

## (undated) DEVICE — KIT TOTAL KNEE TKOFG OMC

## (undated) DEVICE — KIT BONE CEMENT PREPARATION

## (undated) DEVICE — DRAPE STERI-DRAPE 1000 17X11IN

## (undated) DEVICE — SUT LABRALTAPE 1.5X36 BL/WH

## (undated) DEVICE — KIT EAR EAOFE

## (undated) DEVICE — FIBERTAPE COLLAGEN COATED

## (undated) DEVICE — SUT VICRYL PLUS 3-0 SH 18IN

## (undated) DEVICE — PRESSURIZER HI VAC HIP KIT

## (undated) DEVICE — DRAPE STERI 32 X 50

## (undated) DEVICE — SYS CLSR DERMABOND PRINEO 22CM

## (undated) DEVICE — SET CEMENT (SCULP)

## (undated) DEVICE — DRAPE SHOULDER BEACH CHAIR

## (undated) DEVICE — DRESSING AQUACEL RIBBON 2X45CM

## (undated) DEVICE — NDL MAYO CAT 1/2 CIR #5

## (undated) DEVICE — SUT VICRYL PLUS 0 CT1 18IN

## (undated) DEVICE — SUPPORT SLING SHOT II MEDIUM

## (undated) DEVICE — WRAP SHLDR HIP ACCU THRM PACK

## (undated) DEVICE — DRAPE STERI U-SHAPED 47X51IN

## (undated) DEVICE — SUT MONOCRYL 3-0 SH U/D

## (undated) DEVICE — SUT VICRYL+ 1 CT1 18IN

## (undated) DEVICE — Device

## (undated) DEVICE — SOL IRR NACL .9% 3000ML

## (undated) DEVICE — PENCIL VALLEYLAB TELSCP SMK

## (undated) DEVICE — TAPE SURG DURAPORE 2 X10YD

## (undated) DEVICE — BLADE OTOLOGY BEAVER 5X84MM

## (undated) DEVICE — SUCTION SURGICAL FRAZR

## (undated) DEVICE — SEE MEDLINE ITEM 157128

## (undated) DEVICE — SUT VICRYL PLUS 2-0 CT1 18

## (undated) DEVICE — DRAPE U SPLIT SHEET 54X76IN

## (undated) DEVICE — PAD CUSTOM COTTON 2 X 2

## (undated) DEVICE — NOZZLE BNE INJ CEM FEM POST 65

## (undated) DEVICE — PROBE OTOPROBE LONG ANGLE

## (undated) DEVICE — KIT IRR SUCTION HND PIECE

## (undated) DEVICE — GAUZE SPONGE 4X4 12PLY

## (undated) DEVICE — NDL MAYO 2

## (undated) DEVICE — SPONGE COTTON TRAY 4X4IN

## (undated) DEVICE — DRAPE STERI INSTRUMENT 1018

## (undated) DEVICE — HOOD T7 W/ PEEL AWAY LENS

## (undated) DEVICE — DRAPE THREE-QTR REINF 53X77IN

## (undated) DEVICE — TIP IRR FEM CANAL CO-AXIAL

## (undated) DEVICE — SEE MEDLINE ITEM 152622

## (undated) DEVICE — DRESSING TRANS 4X4 TEGADERM